# Patient Record
Sex: MALE | Race: BLACK OR AFRICAN AMERICAN | NOT HISPANIC OR LATINO | Employment: OTHER | RURAL
[De-identification: names, ages, dates, MRNs, and addresses within clinical notes are randomized per-mention and may not be internally consistent; named-entity substitution may affect disease eponyms.]

---

## 2021-05-18 ENCOUNTER — OFFICE VISIT (OUTPATIENT)
Dept: OTOLARYNGOLOGY | Facility: CLINIC | Age: 71
End: 2021-05-18
Payer: COMMERCIAL

## 2021-05-18 VITALS — BODY MASS INDEX: 27.35 KG/M2 | HEIGHT: 75 IN | WEIGHT: 220 LBS

## 2021-05-18 DIAGNOSIS — R42 DIZZINESS AND GIDDINESS: ICD-10-CM

## 2021-05-18 DIAGNOSIS — H90.3 SENSORINEURAL HEARING LOSS (SNHL) OF BOTH EARS: ICD-10-CM

## 2021-05-18 DIAGNOSIS — R42 DIZZINESS: Primary | ICD-10-CM

## 2021-05-18 PROCEDURE — 99213 OFFICE O/P EST LOW 20 MIN: CPT | Mod: PBBFAC | Performed by: OTOLARYNGOLOGY

## 2021-05-18 PROCEDURE — 99213 OFFICE O/P EST LOW 20 MIN: CPT | Mod: S$PBB,,, | Performed by: OTOLARYNGOLOGY

## 2021-05-18 PROCEDURE — 99213 PR OFFICE/OUTPT VISIT, EST, LEVL III, 20-29 MIN: ICD-10-PCS | Mod: S$PBB,,, | Performed by: OTOLARYNGOLOGY

## 2021-05-18 RX ORDER — DIAZEPAM 5 MG/1
5 TABLET ORAL EVERY 6 HOURS PRN
Qty: 2 TABLET | Refills: 0 | Status: SHIPPED | OUTPATIENT
Start: 2021-05-18 | End: 2021-06-17

## 2021-06-08 ENCOUNTER — OFFICE VISIT (OUTPATIENT)
Dept: OTOLARYNGOLOGY | Facility: CLINIC | Age: 71
End: 2021-06-08
Payer: COMMERCIAL

## 2021-06-08 VITALS — HEIGHT: 75 IN | WEIGHT: 220 LBS | BODY MASS INDEX: 27.35 KG/M2

## 2021-06-08 DIAGNOSIS — R42 DIZZINESS: Primary | ICD-10-CM

## 2021-06-08 DIAGNOSIS — H60.93 OTITIS EXTERNA OF BOTH EARS, UNSPECIFIED CHRONICITY, UNSPECIFIED TYPE: ICD-10-CM

## 2021-06-08 PROCEDURE — 99214 OFFICE O/P EST MOD 30 MIN: CPT | Mod: S$PBB,,, | Performed by: OTOLARYNGOLOGY

## 2021-06-08 PROCEDURE — 99214 PR OFFICE/OUTPT VISIT, EST, LEVL IV, 30-39 MIN: ICD-10-PCS | Mod: S$PBB,,, | Performed by: OTOLARYNGOLOGY

## 2021-06-08 PROCEDURE — 99213 OFFICE O/P EST LOW 20 MIN: CPT | Mod: PBBFAC | Performed by: OTOLARYNGOLOGY

## 2021-06-08 RX ORDER — NEOMYCIN SULFATE, POLYMYXIN B SULFATE AND HYDROCORTISONE 10; 3.5; 1 MG/ML; MG/ML; [USP'U]/ML
3 SUSPENSION/ DROPS AURICULAR (OTIC) 3 TIMES DAILY
Qty: 10 ML | Refills: 0 | Status: SHIPPED | OUTPATIENT
Start: 2021-06-08 | End: 2022-09-21

## 2021-06-08 RX ORDER — MECLIZINE HYDROCHLORIDE 25 MG/1
25 TABLET ORAL 3 TIMES DAILY PRN
Qty: 90 TABLET | Refills: 0 | Status: SHIPPED | OUTPATIENT
Start: 2021-06-08 | End: 2021-08-04

## 2021-07-20 DIAGNOSIS — R42 DIZZINESS: Primary | ICD-10-CM

## 2021-08-02 DIAGNOSIS — R42 VERTIGO: Primary | ICD-10-CM

## 2021-08-05 DIAGNOSIS — R42 DIZZINESS: Primary | ICD-10-CM

## 2021-08-23 DIAGNOSIS — R42 DIZZINESS: Primary | ICD-10-CM

## 2021-09-01 ENCOUNTER — DOCUMENTATION ONLY (OUTPATIENT)
Dept: CARDIOLOGY | Facility: CLINIC | Age: 71
End: 2021-09-01

## 2021-09-01 ENCOUNTER — OFFICE VISIT (OUTPATIENT)
Dept: CARDIOLOGY | Facility: CLINIC | Age: 71
End: 2021-09-01
Payer: COMMERCIAL

## 2021-09-01 VITALS
WEIGHT: 223 LBS | BODY MASS INDEX: 27.73 KG/M2 | DIASTOLIC BLOOD PRESSURE: 110 MMHG | OXYGEN SATURATION: 98 % | HEIGHT: 75 IN | SYSTOLIC BLOOD PRESSURE: 200 MMHG | HEART RATE: 83 BPM

## 2021-09-01 DIAGNOSIS — R42 DIZZINESS: ICD-10-CM

## 2021-09-01 DIAGNOSIS — I10 ESSENTIAL HYPERTENSION: ICD-10-CM

## 2021-09-01 DIAGNOSIS — R94.31 NONSPECIFIC ABNORMAL ELECTROCARDIOGRAM (ECG) (EKG): Primary | ICD-10-CM

## 2021-09-01 DIAGNOSIS — R01.1 HEART MURMUR, SYSTOLIC: ICD-10-CM

## 2021-09-01 PROCEDURE — 99205 OFFICE O/P NEW HI 60 MIN: CPT | Mod: S$PBB,,, | Performed by: INTERNAL MEDICINE

## 2021-09-01 PROCEDURE — 93005 ELECTROCARDIOGRAM TRACING: CPT | Mod: PBBFAC | Performed by: INTERNAL MEDICINE

## 2021-09-01 PROCEDURE — 99205 PR OFFICE/OUTPT VISIT, NEW, LEVL V, 60-74 MIN: ICD-10-PCS | Mod: S$PBB,,, | Performed by: INTERNAL MEDICINE

## 2021-09-01 PROCEDURE — 99215 OFFICE O/P EST HI 40 MIN: CPT | Mod: PBBFAC | Performed by: INTERNAL MEDICINE

## 2021-09-01 PROCEDURE — 93010 ELECTROCARDIOGRAM REPORT: CPT | Mod: S$PBB,,, | Performed by: INTERNAL MEDICINE

## 2021-09-01 PROCEDURE — 93010 EKG 12-LEAD: ICD-10-PCS | Mod: S$PBB,,, | Performed by: INTERNAL MEDICINE

## 2021-09-01 RX ORDER — AMLODIPINE BESYLATE 5 MG/1
5 TABLET ORAL DAILY
COMMUNITY

## 2021-09-11 PROBLEM — R42 DIZZINESS: Status: ACTIVE | Noted: 2021-09-11

## 2021-09-11 PROBLEM — R94.31 NONSPECIFIC ABNORMAL ELECTROCARDIOGRAM (ECG) (EKG): Status: ACTIVE | Noted: 2021-09-11

## 2021-09-11 PROBLEM — R01.1 HEART MURMUR, SYSTOLIC: Status: ACTIVE | Noted: 2021-09-11

## 2021-09-11 PROBLEM — I10 ESSENTIAL HYPERTENSION: Status: ACTIVE | Noted: 2021-09-11

## 2021-09-14 ENCOUNTER — HOSPITAL ENCOUNTER (OUTPATIENT)
Dept: CARDIOLOGY | Facility: HOSPITAL | Age: 71
Discharge: HOME | End: 2021-09-14
Attending: INTERNAL MEDICINE
Payer: COMMERCIAL

## 2021-09-14 VITALS — BODY MASS INDEX: 27.73 KG/M2 | HEIGHT: 75 IN | WEIGHT: 223 LBS

## 2021-09-14 DIAGNOSIS — R42 DIZZINESS: ICD-10-CM

## 2021-09-14 DIAGNOSIS — R94.31 NONSPECIFIC ABNORMAL ELECTROCARDIOGRAM (ECG) (EKG): ICD-10-CM

## 2021-09-14 PROCEDURE — 93306 ECHO (CUPID ONLY): ICD-10-PCS | Mod: 26,,, | Performed by: INTERNAL MEDICINE

## 2021-09-14 PROCEDURE — 93306 TTE W/DOPPLER COMPLETE: CPT

## 2021-09-14 PROCEDURE — 93225 XTRNL ECG REC<48 HRS REC: CPT

## 2021-09-14 PROCEDURE — 93306 TTE W/DOPPLER COMPLETE: CPT | Mod: 26,,, | Performed by: INTERNAL MEDICINE

## 2021-09-16 ENCOUNTER — OFFICE VISIT (OUTPATIENT)
Dept: NEUROLOGY | Facility: CLINIC | Age: 71
End: 2021-09-16
Payer: COMMERCIAL

## 2021-09-16 VITALS
BODY MASS INDEX: 28.29 KG/M2 | HEIGHT: 75 IN | HEART RATE: 90 BPM | WEIGHT: 227.5 LBS | DIASTOLIC BLOOD PRESSURE: 98 MMHG | SYSTOLIC BLOOD PRESSURE: 156 MMHG | OXYGEN SATURATION: 96 %

## 2021-09-16 DIAGNOSIS — R42 DIZZINESS: Primary | ICD-10-CM

## 2021-09-16 DIAGNOSIS — I10 ESSENTIAL HYPERTENSION: ICD-10-CM

## 2021-09-16 DIAGNOSIS — Z79.899 ON LONG TERM DRUG THERAPY: ICD-10-CM

## 2021-09-16 DIAGNOSIS — H81.10 BENIGN PAROXYSMAL POSITIONAL VERTIGO, UNSPECIFIED LATERALITY: ICD-10-CM

## 2021-09-16 PROBLEM — Q64.32 CONGENITAL STRICTURE OF URETHRA: Status: ACTIVE | Noted: 2021-09-16

## 2021-09-16 PROBLEM — N39.0 URINARY TRACT INFECTIOUS DISEASE: Status: ACTIVE | Noted: 2019-05-02

## 2021-09-16 PROCEDURE — 99203 OFFICE O/P NEW LOW 30 MIN: CPT | Mod: S$PBB,,, | Performed by: NURSE PRACTITIONER

## 2021-09-16 PROCEDURE — 99215 OFFICE O/P EST HI 40 MIN: CPT | Mod: PBBFAC | Performed by: NURSE PRACTITIONER

## 2021-09-16 PROCEDURE — 99203 PR OFFICE/OUTPT VISIT, NEW, LEVL III, 30-44 MIN: ICD-10-PCS | Mod: S$PBB,,, | Performed by: NURSE PRACTITIONER

## 2021-09-16 RX ORDER — FLUTICASONE PROPIONATE 50 MCG
SPRAY, SUSPENSION (ML) NASAL
COMMUNITY
Start: 2021-08-04

## 2021-09-16 RX ORDER — LOSARTAN POTASSIUM 50 MG/1
50 TABLET ORAL DAILY
COMMUNITY
Start: 2021-09-01

## 2021-09-17 ENCOUNTER — TELEPHONE (OUTPATIENT)
Dept: NEUROLOGY | Facility: CLINIC | Age: 71
End: 2021-09-17

## 2021-09-17 DIAGNOSIS — E55.9 VITAMIN D DEFICIENCY: Primary | ICD-10-CM

## 2021-09-17 RX ORDER — ERGOCALCIFEROL 1.25 MG/1
CAPSULE ORAL
Qty: 12 CAPSULE | Refills: 1 | Status: SHIPPED | OUTPATIENT
Start: 2021-09-17 | End: 2022-03-14

## 2021-09-20 LAB
AORTIC VALVE CUSP SEPERATION: 1.89 CM
AV INDEX (PROSTH): 0.58
AV MEAN GRADIENT: 11 MMHG
AV VALVE AREA: 1.83 CM2
BSA FOR ECHO PROCEDURE: 2.31 M2
CV ECHO LV RWT: 0.56 CM
DOP CALC AO VTI: 41.99 CM
DOP CALC LVOT AREA: 3.1 CM2
DOP CALC LVOT DIAMETER: 2 CM
DOP CALC LVOT STROKE VOLUME: 77.02 CM3
DOP CALCLVOT PEAK VEL VTI: 24.53 CM
E WAVE DECELERATION TIME: 271 MSEC
E/A RATIO: 0.76
E/E' RATIO: 6 M/S
ECHO LV POSTERIOR WALL: 1.22 CM (ref 0.6–1.1)
EJECTION FRACTION: 55 %
FRACTIONAL SHORTENING: 44 % (ref 28–44)
INTERVENTRICULAR SEPTUM: 1.28 CM (ref 0.6–1.1)
IVC DIAMETER: 1 CM
LEFT ATRIUM SIZE: 2.9 CM
LEFT INTERNAL DIMENSION IN SYSTOLE: 2.44 CM (ref 2.1–4)
LEFT VENTRICLE DIASTOLIC VOLUME INDEX: 45.52 ML/M2
LEFT VENTRICLE DIASTOLIC VOLUME: 104.7 ML
LEFT VENTRICLE MASS INDEX: 87 G/M2
LEFT VENTRICLE SYSTOLIC VOLUME INDEX: 5.3 ML/M2
LEFT VENTRICLE SYSTOLIC VOLUME: 12.1 ML
LEFT VENTRICULAR INTERNAL DIMENSION IN DIASTOLE: 4.37 CM (ref 3.5–6)
LEFT VENTRICULAR MASS: 200.94 G
LV LATERAL E/E' RATIO: 5.2 M/S
LV SEPTAL E/E' RATIO: 7.09 M/S
LVOT MG: 3 MMHG
MV PEAK A VEL: 1.03 M/S
MV PEAK E VEL: 0.78 M/S
OHS CV EVENT MONITOR DAY: 0
OHS CV HOLTER LENGTH DECIMAL HOURS: 24
OHS CV HOLTER LENGTH HOURS: 24
OHS CV HOLTER LENGTH MINUTES: 0
OHS CV HOLTER SINUS AVERAGE HR: 60
OHS CV HOLTER SINUS MAX HR: 103
OHS CV HOLTER SINUS MIN HR: 44
PISA TR MAX VEL: 2.73 M/S
RA WIDTH: 2.3 CM
RIGHT VENTRICULAR END-DIASTOLIC DIMENSION: 3.2 CM
RIGHT VENTRICULAR LENGTH IN DIASTOLE (APICAL 4-CHAMBER VIEW): 5.93 CM
RV MID DIAMA: 2.26 CM
TDI LATERAL: 0.15 M/S
TDI SEPTAL: 0.11 M/S
TDI: 0.13 M/S
TR MAX PG: 30 MMHG
TRICUSPID ANNULAR PLANE SYSTOLIC EXCURSION: 3.23 CM

## 2021-09-20 PROCEDURE — 93227 XTRNL ECG REC<48 HR R&I: CPT | Mod: ,,, | Performed by: INTERNAL MEDICINE

## 2021-09-20 PROCEDURE — 93227 HOLTER MONITOR - 24 HOUR (CUPID ONLY): ICD-10-PCS | Mod: ,,, | Performed by: INTERNAL MEDICINE

## 2021-09-22 ENCOUNTER — CLINICAL SUPPORT (OUTPATIENT)
Dept: REHABILITATION | Facility: HOSPITAL | Age: 71
End: 2021-09-22
Payer: COMMERCIAL

## 2021-09-22 DIAGNOSIS — H83.2X9 VESTIBULAR HYPOFUNCTION, UNSPECIFIED LATERALITY: Primary | ICD-10-CM

## 2021-09-22 DIAGNOSIS — H81.10 BENIGN PAROXYSMAL POSITIONAL VERTIGO, UNSPECIFIED LATERALITY: ICD-10-CM

## 2021-09-22 PROCEDURE — 97162 PT EVAL MOD COMPLEX 30 MIN: CPT

## 2021-09-27 ENCOUNTER — CLINICAL SUPPORT (OUTPATIENT)
Dept: REHABILITATION | Facility: HOSPITAL | Age: 71
End: 2021-09-27
Payer: COMMERCIAL

## 2021-09-27 DIAGNOSIS — H83.2X9 VESTIBULAR HYPOFUNCTION, UNSPECIFIED LATERALITY: ICD-10-CM

## 2021-09-27 DIAGNOSIS — R42 DIZZINESS: Chronic | ICD-10-CM

## 2021-09-27 DIAGNOSIS — H83.2X3 VESTIBULAR HYPOFUNCTION OF BOTH EARS: Primary | ICD-10-CM

## 2021-09-27 PROCEDURE — 97112 NEUROMUSCULAR REEDUCATION: CPT

## 2021-09-27 PROCEDURE — 97116 GAIT TRAINING THERAPY: CPT

## 2021-09-30 ENCOUNTER — CLINICAL SUPPORT (OUTPATIENT)
Dept: REHABILITATION | Facility: HOSPITAL | Age: 71
End: 2021-09-30
Payer: COMMERCIAL

## 2021-09-30 DIAGNOSIS — H83.2X9 VESTIBULAR HYPOFUNCTION, UNSPECIFIED LATERALITY: Primary | ICD-10-CM

## 2021-09-30 PROCEDURE — 97112 NEUROMUSCULAR REEDUCATION: CPT

## 2021-09-30 PROCEDURE — 97140 MANUAL THERAPY 1/> REGIONS: CPT

## 2021-10-05 ENCOUNTER — OFFICE VISIT (OUTPATIENT)
Dept: OTOLARYNGOLOGY | Facility: CLINIC | Age: 71
End: 2021-10-05
Payer: COMMERCIAL

## 2021-10-05 VITALS — HEIGHT: 75 IN | WEIGHT: 227 LBS | BODY MASS INDEX: 28.23 KG/M2

## 2021-10-05 DIAGNOSIS — R42 VERTIGO: ICD-10-CM

## 2021-10-05 DIAGNOSIS — H60.93 OTITIS EXTERNA OF BOTH EARS, UNSPECIFIED CHRONICITY, UNSPECIFIED TYPE: Primary | ICD-10-CM

## 2021-10-05 DIAGNOSIS — R09.81 CHRONIC NASAL CONGESTION: ICD-10-CM

## 2021-10-05 PROCEDURE — 99214 PR OFFICE/OUTPT VISIT, EST, LEVL IV, 30-39 MIN: ICD-10-PCS | Mod: S$PBB,,, | Performed by: OTOLARYNGOLOGY

## 2021-10-05 PROCEDURE — 99213 OFFICE O/P EST LOW 20 MIN: CPT | Mod: PBBFAC | Performed by: OTOLARYNGOLOGY

## 2021-10-05 PROCEDURE — 99214 OFFICE O/P EST MOD 30 MIN: CPT | Mod: S$PBB,,, | Performed by: OTOLARYNGOLOGY

## 2021-10-05 RX ORDER — NEOMYCIN SULFATE, POLYMYXIN B SULFATE AND HYDROCORTISONE 10; 3.5; 1 MG/ML; MG/ML; [USP'U]/ML
3 SUSPENSION/ DROPS AURICULAR (OTIC) 2 TIMES DAILY
Qty: 10 ML | Refills: 0 | Status: SHIPPED | OUTPATIENT
Start: 2021-10-05 | End: 2022-09-21

## 2021-10-11 ENCOUNTER — DOCUMENTATION ONLY (OUTPATIENT)
Dept: REHABILITATION | Facility: HOSPITAL | Age: 71
End: 2021-10-11

## 2021-12-20 DIAGNOSIS — L29.9 ITCHING OF EAR: Primary | ICD-10-CM

## 2021-12-20 RX ORDER — NEOMYCIN SULFATE, POLYMYXIN B SULFATE AND HYDROCORTISONE 10; 3.5; 1 MG/ML; MG/ML; [USP'U]/ML
3 SUSPENSION/ DROPS AURICULAR (OTIC) 2 TIMES DAILY
Qty: 10 ML | Refills: 0 | Status: SHIPPED | OUTPATIENT
Start: 2021-12-20 | End: 2022-09-21

## 2022-01-31 ENCOUNTER — OFFICE VISIT (OUTPATIENT)
Dept: FAMILY MEDICINE | Facility: CLINIC | Age: 72
End: 2022-01-31
Payer: COMMERCIAL

## 2022-01-31 VITALS
OXYGEN SATURATION: 97 % | TEMPERATURE: 98 F | BODY MASS INDEX: 27.48 KG/M2 | DIASTOLIC BLOOD PRESSURE: 68 MMHG | HEART RATE: 60 BPM | SYSTOLIC BLOOD PRESSURE: 121 MMHG | RESPIRATION RATE: 17 BRPM | WEIGHT: 221 LBS | HEIGHT: 75 IN

## 2022-01-31 DIAGNOSIS — Z20.822 EXPOSURE TO COVID-19 VIRUS: Primary | ICD-10-CM

## 2022-01-31 DIAGNOSIS — Z20.822 ENCOUNTER FOR LABORATORY TESTING FOR COVID-19 VIRUS: ICD-10-CM

## 2022-01-31 DIAGNOSIS — U07.1 COVID-19: ICD-10-CM

## 2022-01-31 LAB — SARS-COV-2 RNA RESP QL NAA+PROBE: POSITIVE

## 2022-01-31 PROCEDURE — 87635 SARS-COV-2 COVID-19 AMP PRB: CPT | Mod: ,,, | Performed by: CLINICAL MEDICAL LABORATORY

## 2022-01-31 PROCEDURE — 3078F PR MOST RECENT DIASTOLIC BLOOD PRESSURE < 80 MM HG: ICD-10-PCS | Mod: ,,, | Performed by: NURSE PRACTITIONER

## 2022-01-31 PROCEDURE — 1159F PR MEDICATION LIST DOCUMENTED IN MEDICAL RECORD: ICD-10-PCS | Mod: ,,, | Performed by: NURSE PRACTITIONER

## 2022-01-31 PROCEDURE — 3008F PR BODY MASS INDEX (BMI) DOCUMENTED: ICD-10-PCS | Mod: ,,, | Performed by: NURSE PRACTITIONER

## 2022-01-31 PROCEDURE — 87635 SARS-COV-2 (COVID-19) QUALITATIVE PCR: ICD-10-PCS | Mod: ,,, | Performed by: CLINICAL MEDICAL LABORATORY

## 2022-01-31 PROCEDURE — 1159F MED LIST DOCD IN RCRD: CPT | Mod: ,,, | Performed by: NURSE PRACTITIONER

## 2022-01-31 PROCEDURE — 3074F PR MOST RECENT SYSTOLIC BLOOD PRESSURE < 130 MM HG: ICD-10-PCS | Mod: ,,, | Performed by: NURSE PRACTITIONER

## 2022-01-31 PROCEDURE — 99202 PR OFFICE/OUTPT VISIT, NEW, LEVL II, 15-29 MIN: ICD-10-PCS | Mod: ,,, | Performed by: NURSE PRACTITIONER

## 2022-01-31 PROCEDURE — 99202 OFFICE O/P NEW SF 15 MIN: CPT | Mod: ,,, | Performed by: NURSE PRACTITIONER

## 2022-01-31 PROCEDURE — 3008F BODY MASS INDEX DOCD: CPT | Mod: ,,, | Performed by: NURSE PRACTITIONER

## 2022-01-31 PROCEDURE — 3074F SYST BP LT 130 MM HG: CPT | Mod: ,,, | Performed by: NURSE PRACTITIONER

## 2022-01-31 PROCEDURE — 3078F DIAST BP <80 MM HG: CPT | Mod: ,,, | Performed by: NURSE PRACTITIONER

## 2022-01-31 RX ORDER — NAPROXEN SODIUM 220 MG/1
81 TABLET, FILM COATED ORAL DAILY
COMMUNITY
Start: 2021-10-14

## 2022-01-31 RX ORDER — GABAPENTIN 300 MG/1
300 CAPSULE ORAL 3 TIMES DAILY
COMMUNITY
Start: 2021-11-10 | End: 2023-06-13

## 2022-01-31 NOTE — PROGRESS NOTES
IZABELLA Lucas   [facility]      PATIENT NAME: James Martinez  : 1950  DATE: 22  MRN: [id number]      Patient PCP Information     Provider PCP Type    Trevor Resendez MD General          Reason for Visit / Chief Complaint: exposure to covid         History of Present Illness / Problem Focused Workflow     James Martinez presents to the clinic with exposure to covid     HPI   Mr Martinez presents for covid screening. Per patient family was visiting from [place]. Patients family left last 2022 to return to [place]. Pt was notified that family has tested positive for covid upon return. Patient is currently asymptomatic. Vaccinated with moderna x 3 doses.     Review of Systems     Review of Systems   Constitutional: Negative for activity change, appetite change, chills, diaphoresis, fatigue, fever and unexpected weight change.   HENT: Negative for congestion, ear pain, facial swelling, hearing loss, nosebleeds and sore throat.    Respiratory: Negative for apnea, cough, shortness of breath and wheezing.    Cardiovascular: Negative for chest pain, palpitations and leg swelling.   Gastrointestinal: Negative for abdominal distention, abdominal pain, blood in stool, constipation, diarrhea and nausea.   Endocrine: Negative for cold intolerance, heat intolerance, polydipsia, polyphagia and polyuria.   Genitourinary: Negative for decreased urine volume, difficulty urinating, dysuria, flank pain, frequency, hematuria and urgency.   Musculoskeletal: Negative for arthralgias, joint swelling and myalgias.   Skin: Negative for color change and rash.   Neurological: Negative for dizziness, tremors, seizures, syncope, facial asymmetry, speech difficulty, weakness, light-headedness, numbness and headaches.   Hematological: Negative for adenopathy. Does not bruise/bleed easily.   Psychiatric/Behavioral: Negative for behavioral problems and confusion.       Medical / Social / Family History  "    Past Medical History:   Diagnosis Date    Hypertension        Past Surgical History:   Procedure Laterality Date    APPENDECTOMY      BACK SURGERY      ROTATOR CUFF REPAIR Right        Social History    reports that he has quit smoking. His smoking use included cigarettes. He has never used smokeless tobacco. He reports previous drug use. Drug: Marijuana. He reports that he does not drink alcohol.    Family History  's family history includes Hypertension in his mother.    Medications and Allergies     Medications  Outpatient Medications Marked as Taking for the 1/31/22 encounter (Office Visit) with IZABELLA Lucas   Medication Sig Dispense Refill    amLODIPine (NORVASC) 5 MG tablet Take 5 mg by mouth once daily.      aspirin 81 MG Chew Take 81 mg by mouth once daily.      fluticasone propionate (FLONASE) 50 mcg/actuation nasal spray INHALE 1 TO 2 PUFFS IN EACH NOSTRIL AT BEDTIME      gabapentin (NEURONTIN) 300 MG capsule Take 300 mg by mouth 3 (three) times daily.      losartan (COZAAR) 50 MG tablet Take 50 mg by mouth once daily.         Allergies  Review of patient's allergies indicates:   Allergen Reactions    Pcn [penicillins]     Penicillamine        Physical Examination     Vitals:    01/31/22 1128   BP: 121/68   BP Location: Right arm   Patient Position: Sitting   Pulse: 60   Resp: 17   Temp: 97.6 °F (36.4 °C)   SpO2: 97%   Weight: 100.2 kg (221 lb)   Height: 6' 3" (1.905 m)       Physical Exam  Vitals reviewed.   Constitutional:       Appearance: Normal appearance.   HENT:      Head: Normocephalic.      Right Ear: External ear normal.      Left Ear: External ear normal.      Nose: Nose normal.      Mouth/Throat:      Mouth: Mucous membranes are moist.   Eyes:      Extraocular Movements: Extraocular movements intact.   Cardiovascular:      Rate and Rhythm: Normal rate.      Pulses: Normal pulses.      Heart sounds: Normal heart sounds.   Pulmonary:      Effort: Pulmonary effort is " normal. No respiratory distress.      Breath sounds: Normal breath sounds. No stridor. No wheezing, rhonchi or rales.   Chest:      Chest wall: No tenderness.   Musculoskeletal:         General: Normal range of motion.      Cervical back: Normal range of motion.   Skin:     General: Skin is warm and dry.      Capillary Refill: Capillary refill takes less than 2 seconds.   Neurological:      General: No focal deficit present.      Mental Status: He is alert.   Psychiatric:         Mood and Affect: Mood normal.         Behavior: Behavior normal.         Thought Content: Thought content normal.         Judgment: Judgment normal.           Office Visit on 01/31/2022   Component Date Value Ref Range Status    SARS CoV-2 PCR 01/31/2022 Positive* Negative, Invalid Final             Assessment and Plan (including Health Maintenance)     Plan:   Exposure to COVID-19 virus    Encounter for laboratory testing for COVID-19 virus  -     COVID-19 Routine Screening; Future; Expected date: 01/31/2022    COVID-19     Patients PCR returned positive. Called kofi 2/1 to notify of results. Now with cough sob. Referral placed for monoclonal infusion. Moderate risk.   Patient not at clinic when results received. Fact and consent to be obtained by infusion staff scheduled. Order faxed.   There are no Patient Instructions on file for this visit.       Health Maintenance Due   Topic Date Due    Hepatitis C Screening  Never done    COVID-19 Vaccine (1) Never done    TETANUS VACCINE  Never done    Colorectal Cancer Screening  Never done    Shingles Vaccine (1 of 2) Never done    Pneumococcal Vaccines (Age 65+) (1 of 1 - PPSV23) Never done    Abdominal Aortic Aneurysm Screening  Never done    Influenza Vaccine (1) Never done         There is no immunization history on file for this patient.     Problem List Items Addressed This Visit    None     Visit Diagnoses     Exposure to COVID-19 virus    -  Primary    Encounter for laboratory  testing for COVID-19 virus        Relevant Orders    COVID-19 Routine Screening (Completed)    COVID-19              Health Maintenance Topics with due status: Not Due       Topic Last Completion Date    Lipid Panel 10/30/2018       Future Appointments   Date Time Provider Department Center   2/17/2022  3:00 PM IZABELLA Lucas [facility]   3/23/2022  9:30 AM Donovan Moreno NP OB NEURO [facility]            Signature:  IZABELLA Lucas  [facility]     Date of encounter: 1/31/22

## 2022-02-01 DIAGNOSIS — U07.1 COVID-19 VIRUS DETECTED: ICD-10-CM

## 2022-02-08 ENCOUNTER — OFFICE VISIT (OUTPATIENT)
Dept: FAMILY MEDICINE | Facility: CLINIC | Age: 72
End: 2022-02-08
Payer: COMMERCIAL

## 2022-02-08 VITALS
OXYGEN SATURATION: 97 % | DIASTOLIC BLOOD PRESSURE: 88 MMHG | TEMPERATURE: 98 F | SYSTOLIC BLOOD PRESSURE: 151 MMHG | BODY MASS INDEX: 27.48 KG/M2 | HEART RATE: 64 BPM | RESPIRATION RATE: 18 BRPM | HEIGHT: 75 IN | WEIGHT: 221 LBS

## 2022-02-08 DIAGNOSIS — U07.1 COVID-19: Primary | ICD-10-CM

## 2022-02-08 LAB
CTP QC/QA: YES
SARS-COV-2 AG RESP QL IA.RAPID: POSITIVE

## 2022-02-08 PROCEDURE — 3008F PR BODY MASS INDEX (BMI) DOCUMENTED: ICD-10-PCS | Mod: ,,, | Performed by: NURSE PRACTITIONER

## 2022-02-08 PROCEDURE — 3077F PR MOST RECENT SYSTOLIC BLOOD PRESSURE >= 140 MM HG: ICD-10-PCS | Mod: ,,, | Performed by: NURSE PRACTITIONER

## 2022-02-08 PROCEDURE — 3079F PR MOST RECENT DIASTOLIC BLOOD PRESSURE 80-89 MM HG: ICD-10-PCS | Mod: ,,, | Performed by: NURSE PRACTITIONER

## 2022-02-08 PROCEDURE — 3008F BODY MASS INDEX DOCD: CPT | Mod: ,,, | Performed by: NURSE PRACTITIONER

## 2022-02-08 PROCEDURE — 87426 SARSCOV CORONAVIRUS AG IA: CPT | Mod: QW,,, | Performed by: NURSE PRACTITIONER

## 2022-02-08 PROCEDURE — 1159F PR MEDICATION LIST DOCUMENTED IN MEDICAL RECORD: ICD-10-PCS | Mod: ,,, | Performed by: NURSE PRACTITIONER

## 2022-02-08 PROCEDURE — 87426 SARS CORONAVIRUS 2 ANTIGEN POCT: ICD-10-PCS | Mod: QW,,, | Performed by: NURSE PRACTITIONER

## 2022-02-08 PROCEDURE — 1160F RVW MEDS BY RX/DR IN RCRD: CPT | Mod: ,,, | Performed by: NURSE PRACTITIONER

## 2022-02-08 PROCEDURE — 1159F MED LIST DOCD IN RCRD: CPT | Mod: ,,, | Performed by: NURSE PRACTITIONER

## 2022-02-08 PROCEDURE — 99212 PR OFFICE/OUTPT VISIT, EST, LEVL II, 10-19 MIN: ICD-10-PCS | Mod: ,,, | Performed by: NURSE PRACTITIONER

## 2022-02-08 PROCEDURE — 3077F SYST BP >= 140 MM HG: CPT | Mod: ,,, | Performed by: NURSE PRACTITIONER

## 2022-02-08 PROCEDURE — 99212 OFFICE O/P EST SF 10 MIN: CPT | Mod: ,,, | Performed by: NURSE PRACTITIONER

## 2022-02-08 PROCEDURE — 3079F DIAST BP 80-89 MM HG: CPT | Mod: ,,, | Performed by: NURSE PRACTITIONER

## 2022-02-08 PROCEDURE — 1160F PR REVIEW ALL MEDS BY PRESCRIBER/CLIN PHARMACIST DOCUMENTED: ICD-10-PCS | Mod: ,,, | Performed by: NURSE PRACTITIONER

## 2022-02-08 NOTE — PROGRESS NOTES
IZABELLA Lucas   [facility]      PATIENT NAME: James Martinez  : 1950  DATE: 22  MRN: [id number]      Patient PCP Information     Provider PCP Type    Trevor Resendez MD General          Reason for Visit / Chief Complaint: COVID-19 Concerns         History of Present Illness / Problem Focused Workflow     James Martinez presents to the clinic with COVID-19 Concerns     HPI   Mr Martinez is here for follow up covid test. He is scheduled for vascular appt tomorrow in [place]. Requested follow up test to attend appt. Patient completed quarantine. No concerns voiced. Positive 2022    Review of Systems     Review of Systems   Constitutional: Negative.    HENT: Negative.    Eyes: Negative.    Respiratory: Negative.    Cardiovascular: Negative.    Gastrointestinal: Negative.    Endocrine: Negative.    Genitourinary: Negative.    Musculoskeletal: Negative.    Skin: Negative.    Allergic/Immunologic: Negative.    Neurological: Negative.    Hematological: Negative.    Psychiatric/Behavioral: Negative.        Medical / Social / Family History     Past Medical History:   Diagnosis Date    Hypertension        Past Surgical History:   Procedure Laterality Date    APPENDECTOMY      BACK SURGERY      ROTATOR CUFF REPAIR Right        Social History    reports that he has quit smoking. His smoking use included cigarettes. He has never used smokeless tobacco. He reports previous drug use. Drug: Marijuana. He reports that he does not drink alcohol.    Family History  's family history includes Hypertension in his mother.    Medications and Allergies     Medications  No outpatient medications have been marked as taking for the 22 encounter (Office Visit) with IZABELLA Lucas.       Allergies  Review of patient's allergies indicates:   Allergen Reactions    Pcn [penicillins]     Penicillamine        Physical Examination     Vitals:    22 1429   BP: (!) 151/88   BP Location: Left  "arm  Comment: left wrist   Patient Position: Sitting   BP Method: Medium (Automatic)   Pulse: 64   Resp: 18   Temp: 98.3 °F (36.8 °C)   SpO2: 97%   Weight: 100.2 kg (221 lb)  Comment: prev visit   Height: 6' 3" (1.905 m)  Comment: prev appt       Physical Exam  Vitals reviewed.   HENT:      Head: Normocephalic.      Right Ear: External ear normal.      Left Ear: External ear normal.      Nose: Nose normal.      Mouth/Throat:      Mouth: Mucous membranes are moist.   Cardiovascular:      Rate and Rhythm: Normal rate.      Pulses: Normal pulses.   Pulmonary:      Effort: Pulmonary effort is normal.   Abdominal:      Palpations: Abdomen is soft.   Musculoskeletal:         General: Normal range of motion.      Cervical back: Normal range of motion.   Skin:     General: Skin is warm and dry.      Capillary Refill: Capillary refill takes less than 2 seconds.   Neurological:      Mental Status: He is alert.      Comments: Slow response time   Psychiatric:         Mood and Affect: Mood normal.         Behavior: Behavior normal.         Thought Content: Thought content normal.         Judgment: Judgment normal.           Office Visit on 01/31/2022   Component Date Value Ref Range Status    SARS CoV-2 PCR 01/31/2022 Positive* Negative, Invalid Final             Assessment and Plan (including Health Maintenance)     Plan:   COVID-19  -     SARS Coronavirus 2 Antigen, POCT     Test remains positive afebrile quarantine complete  There are no Patient Instructions on file for this visit.       Health Maintenance Due   Topic Date Due    Hepatitis C Screening  Never done    Lipid Panel  Never done    TETANUS VACCINE  Never done    Colorectal Cancer Screening  Never done    Shingles Vaccine (1 of 2) Never done    Pneumococcal Vaccines (Age 65+) (1 of 1 - PPSV23) Never done    Abdominal Aortic Aneurysm Screening  Never done    COVID-19 Vaccine (3 - Booster for Pfizer series) 08/22/2021    Influenza Vaccine (1) Never done "       Most Recent Immunizations   Administered Date(s) Administered    COVID-19, MRNA, LN-S, PF (Pfizer) (Purple Cap) 02/22/2021        Problem List Items Addressed This Visit    None     Visit Diagnoses     COVID-19    -  Primary    Relevant Orders    SARS Coronavirus 2 Antigen, POCT          The patient has no Health Maintenance topics of status Not Due    Future Appointments   Date Time Provider Department Center   2/8/2022  3:00 PM IZABELLA Lucas [facility]   2/17/2022  3:00 PM IZABELLA Lucas CCC [facility]   3/23/2022  9:30 AM Donovan Moreno NP [facility] NEURO [facility]            Signature:  IZABELLA Lucas  [facility]     Date of encounter: 2/8/22

## 2022-03-14 DIAGNOSIS — E55.9 VITAMIN D DEFICIENCY: ICD-10-CM

## 2022-03-14 DIAGNOSIS — R42 DIZZINESS: ICD-10-CM

## 2022-03-14 RX ORDER — ERGOCALCIFEROL 1.25 MG/1
CAPSULE ORAL
Qty: 12 CAPSULE | Refills: 1 | Status: SHIPPED | OUTPATIENT
Start: 2022-03-14 | End: 2022-09-21 | Stop reason: SDUPTHER

## 2022-03-14 RX ORDER — MECLIZINE HYDROCHLORIDE 25 MG/1
TABLET ORAL
Qty: 90 TABLET | Refills: 0 | Status: SHIPPED | OUTPATIENT
Start: 2022-03-14 | End: 2023-06-13
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
## 2022-06-08 ENCOUNTER — OFFICE VISIT (OUTPATIENT)
Dept: NEUROLOGY | Facility: CLINIC | Age: 72
End: 2022-06-08
Payer: COMMERCIAL

## 2022-06-08 ENCOUNTER — TELEPHONE (OUTPATIENT)
Dept: NEUROLOGY | Facility: CLINIC | Age: 72
End: 2022-06-08
Payer: COMMERCIAL

## 2022-06-08 VITALS
BODY MASS INDEX: 27.58 KG/M2 | HEIGHT: 75 IN | DIASTOLIC BLOOD PRESSURE: 88 MMHG | HEART RATE: 67 BPM | OXYGEN SATURATION: 98 % | SYSTOLIC BLOOD PRESSURE: 150 MMHG | WEIGHT: 221.81 LBS

## 2022-06-08 DIAGNOSIS — Z79.899 ON LONG TERM DRUG THERAPY: ICD-10-CM

## 2022-06-08 DIAGNOSIS — R20.2 PARESTHESIA: ICD-10-CM

## 2022-06-08 DIAGNOSIS — R29.898 WEAKNESS OF BOTH LOWER EXTREMITIES: ICD-10-CM

## 2022-06-08 DIAGNOSIS — R42 DIZZINESS: Primary | Chronic | ICD-10-CM

## 2022-06-08 DIAGNOSIS — I10 ESSENTIAL HYPERTENSION: Chronic | ICD-10-CM

## 2022-06-08 DIAGNOSIS — R26.89 BALANCE PROBLEMS: ICD-10-CM

## 2022-06-08 DIAGNOSIS — E55.9 VITAMIN D DEFICIENCY: ICD-10-CM
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
## 2022-06-08 PROBLEM — S27.329A PULMONARY CONTUSION: Status: ACTIVE | Noted: 2021-10-08

## 2022-06-08 PROBLEM — F41.9 ANXIETY: Status: ACTIVE | Noted: 2022-06-08

## 2022-06-08 PROBLEM — R73.9 HYPERGLYCEMIA: Status: ACTIVE | Noted: 2022-06-08

## 2022-06-08 PROBLEM — E11.9 TYPE 2 DIABETES MELLITUS: Status: ACTIVE | Noted: 2021-10-08

## 2022-06-08 PROBLEM — S22.009A: Status: ACTIVE | Noted: 2021-10-08

## 2022-06-08 PROBLEM — E78.2 MIXED HYPERLIPIDEMIA: Status: ACTIVE | Noted: 2022-06-08

## 2022-06-08 PROBLEM — S27.0XXA TRAUMATIC PNEUMOTHORAX: Status: ACTIVE | Noted: 2021-10-08

## 2022-06-08 PROBLEM — S32.009A LUMBAR TRANSVERSE PROCESS FRACTURE: Status: ACTIVE | Noted: 2021-10-08

## 2022-06-08 PROCEDURE — 1160F RVW MEDS BY RX/DR IN RCRD: CPT | Mod: CPTII,,, | Performed by: NURSE PRACTITIONER

## 2022-06-08 PROCEDURE — 1159F MED LIST DOCD IN RCRD: CPT | Mod: CPTII,,, | Performed by: NURSE PRACTITIONER

## 2022-06-08 PROCEDURE — 1101F PT FALLS ASSESS-DOCD LE1/YR: CPT | Mod: CPTII,,, | Performed by: NURSE PRACTITIONER

## 2022-06-08 PROCEDURE — 4010F ACE/ARB THERAPY RXD/TAKEN: CPT | Mod: CPTII,,, | Performed by: NURSE PRACTITIONER

## 2022-06-08 PROCEDURE — 1160F PR REVIEW ALL MEDS BY PRESCRIBER/CLIN PHARMACIST DOCUMENTED: ICD-10-PCS | Mod: CPTII,,, | Performed by: NURSE PRACTITIONER

## 2022-06-08 PROCEDURE — 3077F SYST BP >= 140 MM HG: CPT | Mod: CPTII,,, | Performed by: NURSE PRACTITIONER

## 2022-06-08 PROCEDURE — 3008F BODY MASS INDEX DOCD: CPT | Mod: CPTII,,, | Performed by: NURSE PRACTITIONER

## 2022-06-08 PROCEDURE — 3079F PR MOST RECENT DIASTOLIC BLOOD PRESSURE 80-89 MM HG: ICD-10-PCS | Mod: CPTII,,, | Performed by: NURSE PRACTITIONER

## 2022-06-08 PROCEDURE — 99214 OFFICE O/P EST MOD 30 MIN: CPT | Mod: S$PBB,,, | Performed by: NURSE PRACTITIONER

## 2022-06-08 PROCEDURE — 3079F DIAST BP 80-89 MM HG: CPT | Mod: CPTII,,, | Performed by: NURSE PRACTITIONER

## 2022-06-08 PROCEDURE — 1101F PR PT FALLS ASSESS DOC 0-1 FALLS W/OUT INJ PAST YR: ICD-10-PCS | Mod: CPTII,,, | Performed by: NURSE PRACTITIONER

## 2022-06-08 PROCEDURE — 4010F PR ACE/ARB THEARPY RXD/TAKEN: ICD-10-PCS | Mod: CPTII,,, | Performed by: NURSE PRACTITIONER

## 2022-06-08 PROCEDURE — 3288F FALL RISK ASSESSMENT DOCD: CPT | Mod: CPTII,,, | Performed by: NURSE PRACTITIONER

## 2022-06-08 PROCEDURE — 1159F PR MEDICATION LIST DOCUMENTED IN MEDICAL RECORD: ICD-10-PCS | Mod: CPTII,,, | Performed by: NURSE PRACTITIONER

## 2022-06-08 PROCEDURE — 3008F PR BODY MASS INDEX (BMI) DOCUMENTED: ICD-10-PCS | Mod: CPTII,,, | Performed by: NURSE PRACTITIONER

## 2022-06-08 PROCEDURE — 99215 OFFICE O/P EST HI 40 MIN: CPT | Mod: PBBFAC | Performed by: NURSE PRACTITIONER

## 2022-06-08 PROCEDURE — 3077F PR MOST RECENT SYSTOLIC BLOOD PRESSURE >= 140 MM HG: ICD-10-PCS | Mod: CPTII,,, | Performed by: NURSE PRACTITIONER

## 2022-06-08 PROCEDURE — 3288F PR FALLS RISK ASSESSMENT DOCUMENTED: ICD-10-PCS | Mod: CPTII,,, | Performed by: NURSE PRACTITIONER

## 2022-06-08 PROCEDURE — 99214 PR OFFICE/OUTPT VISIT, EST, LEVL IV, 30-39 MIN: ICD-10-PCS | Mod: S$PBB,,, | Performed by: NURSE PRACTITIONER

## 2022-06-08 RX ORDER — CETIRIZINE HYDROCHLORIDE 10 MG/1
TABLET ORAL
COMMUNITY
Start: 2022-01-26

## 2022-06-08 RX ORDER — LORAZEPAM 0.5 MG/1
TABLET ORAL
Qty: 2 TABLET | Refills: 0 | Status: SHIPPED | OUTPATIENT
Start: 2022-06-08 | End: 2022-09-21

## 2022-06-08 NOTE — PATIENT INSTRUCTIONS
1. Labs: cbc, cmp  2. Referral to physical therapy for gait, balance, and dizziness  3. Follow up with eye doctor for blurred vision  4. Keep follow up with cardiologist Dr. Saxena  5. Keep follow up with ENT Dr. De La O  6. Regular follow up with PCP for medical management and blood pressure  7. Keep blood pressure under control  8. Orthostatic BP check  9. Referral for EMG NCS all 4 extremities at [facility]  10. MRI of the lumbar spine with and without contrast  11. Rx ativan 0.5 mg one tablet 30 minutes before MRI, may repeat x 1, must have a   12. Follow up with neurology in 3 months or sooner if needed

## 2022-06-08 NOTE — TELEPHONE ENCOUNTER
Pt voiced understanding  ----- Message from Donovan Moreno NP sent at 6/8/2022  5:17 PM CDT -----  Please notify patient his labs looked good, thanks

## 2022-06-23 ENCOUNTER — TELEPHONE (OUTPATIENT)
Dept: NEUROLOGY | Facility: CLINIC | Age: 72
End: 2022-06-23
Payer: COMMERCIAL

## 2022-06-23 NOTE — TELEPHONE ENCOUNTER
Called and informed pt of MRI Lumbar Spine results that shower mild degenerative changes. EMG/NCS of the ilan lower ext's was a limited study due to significant edema up to the knee. F/U EMG/NCS is recommended when edema subsides. Pt voiced understanding by verbal return.              ----- Message from Donovan Moreno NP sent at 6/23/2022  2:46 PM CDT -----  Please let patient know the mri of the lumbar showed mild degenerative changes, thanks

## 2022-06-29 ENCOUNTER — CLINICAL SUPPORT (OUTPATIENT)
Dept: REHABILITATION | Facility: HOSPITAL | Age: 72
End: 2022-06-29
Payer: COMMERCIAL

## 2022-06-29 DIAGNOSIS — R26.89 BALANCE PROBLEMS: ICD-10-CM

## 2022-06-29 DIAGNOSIS — R42 DIZZINESS: Chronic | ICD-10-CM

## 2022-06-29 DIAGNOSIS — R29.898 WEAKNESS OF BOTH LOWER EXTREMITIES: ICD-10-CM

## 2022-06-29 PROBLEM — H83.2X9 VESTIBULAR HYPOFUNCTION: Status: RESOLVED | Noted: 2021-09-22 | Resolved: 2022-06-29

## 2022-06-29 PROCEDURE — 97161 PT EVAL LOW COMPLEX 20 MIN: CPT
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
## 2022-07-07 ENCOUNTER — CLINICAL SUPPORT (OUTPATIENT)
Dept: REHABILITATION | Facility: HOSPITAL | Age: 72
End: 2022-07-07
Payer: COMMERCIAL

## 2022-07-07 DIAGNOSIS — R42 DIZZINESS: Chronic | ICD-10-CM

## 2022-07-07 DIAGNOSIS — R26.89 BALANCE PROBLEMS: ICD-10-CM

## 2022-07-07 DIAGNOSIS — R29.898 WEAKNESS OF BOTH LOWER EXTREMITIES: Primary | ICD-10-CM

## 2022-07-07 PROCEDURE — 97110 THERAPEUTIC EXERCISES: CPT

## 2022-07-07 PROCEDURE — 97112 NEUROMUSCULAR REEDUCATION: CPT

## 2022-07-07 NOTE — PROGRESS NOTES
[facility] OUTPATIENT REHABILITATION  Physical Therapy Treatment Note     Name: James Martinez  Clinic Number: [id number]    Therapy Diagnosis:   Encounter Diagnoses   Name Primary?    Weakness of both lower extremities Yes    Dizziness     Balance problems      Physician: Donovan Moreno NP    Visit Date: 7/7/2022    Physician Orders: PT Eval and Treat  Medical Diagnosis from Referral: R42 (ICD-10-CM) - Dizziness; R29.898 (ICD-10-CM) - Weakness of both lower extremities; R26.89 (ICD-10-CM) - Balance problems  Evaluation Date: 6/29/2022  Authorization Period Expiration: 9/4/2022  Plan of Care Expiration: 6/29/2022 to 8/19/2022  Updated Plan of Care Due: 7/29/2022  Visit # / Visits authorized: 2/9 (indluding initial evaluation)    Time In: 1105  Time Out: 1200  Total Billable Time: 55 minutes    Precautions: Standard, Diabetes and blood thinners  Functional Level Prior to Evaluation: independent with all functional mobility without assistive device; assist from friend with ADLs and self-care    Subjective     Pt reports: He is doing well. He has been working on his home exercise program. He has been having difficulty performing bridges secondary to associated shoulder pain caused by the surface he is performing them on. Patient reports he will continue to work on finding a better surface to perform bridges on.    He was compliant with home exercise program.  Response to previous treatment: This is patient's first treatment session following initial evaluation.    Pain: 0/10  Location: N/A    Objective     Reg received therapeutic exercises to develop strength, endurance, ROM, flexibility, posture and core stabilization for 45 minutes including:    NuStep: level 5; 7 minutes  Calf stretch on slant board: 2 minutes  Hamstring stretch on step: 3 x 30 second holds  Leg press: 5 plates; x 30 reps  Cybex hamstring curls: 5 plates; x 30 reps  Cybex knee extensions: 2 plates; x 30 reps  Cybex hip  abduction: 2 plates; x 20 reps each  Seated thoracic extension stretch over back of chair: x 4 reps with 15 second holds    Reg participated in neuromuscular re-education activities to improve: Balance, Coordination, Proprioception and Posture for 10 minutes. The following activities were included:    Standing scapular retraction with bilateral upper extremity extension: green theraband; 3 x 10 reps  Double limb support on foam without upper extremity support with eyes closed: 1 minute  Beam forward: 1 lap    Home Exercises Provided and Patient Education Provided     Education provided: Patient educated on the importance of completing skilled physical therapy treatment and home exercise program as prescribed to maximize functional gains.     Written Home Exercises Provided: Patient instructed to cont prior HEP. Exercises were reviewed and Reg was able to demonstrate them prior to the end of the session.  Reg demonstrated good  understanding of the education provided.     See EMR under Patient Instructions for exercises provided prior visit.    Assessment     Patient with good effort throughout treatment. No significant difficulty completing therapeutic exercise or neuromuscular re-education. Patient required minimal assist to complete balance beam activity. Patient required verbal cues to achieve bilateral heel strike with gait. Physical Therapist will continue to progress there, neuromuscular re-education, therapeutic activities, and gait training as able.    Reg Is progressing well towards his goals.   Pt prognosis is Good.     Pt will continue to benefit from skilled outpatient physical therapy to address the deficits listed in the problem list box on initial evaluation, provide pt/family education, and to maximize pt's level of independence in the home and community environment.     Pt's spiritual, cultural, and educational needs considered and pt agreeable to plan of care and goals.     Anticipated barriers to  physical therapy: compliance with home exercise program    Goals:    Short Term Goals:  1. Patient will demonstrate independence with home exercise program to ensure carryover of treatment.  2. Patient will perform supine to/from sit with modified independence within 4 seconds each to improve independence and safety with bed mobility.  3. Patient will perform sit to/from stand with contact guard assist without upper extremity support to improve independence and safety with transfers.  4. Patient will improve bilateral lower extremity strength to 4+/5 to improve independence and safety with bed mobility, transfers, and gait.  5. Patient will improve Tinetti Balance + Gait score to 24/28 to improve dynamic standing balance and lower fall risk.   6. Patient will ambulate 300 feet without assistive device with supervision with improved upright posture, bilateral foot clearance, step lengths, and arm swing to improve independence and safety with gait.      Long Term Goals:  1. Patient will perform sit to/from stand with independence without upper extremity support to improve independence and safety with transfers.  2. Patient will improve bilateral lower extremity strength to 5/5 to improve independence and safety with bed mobility, transfers, and gait.  3. Patient will ambulate 600 feet without assistive device with independence with improved upright posture, bilateral foot clearance, step lengths, and arm swing including up/down curbs and ramps to improve independence and safety with community ambulation.  4. Patient will report ability to walk 2 miles in neighborhood without stopping secondary to fatigue to demonstrate improved cardiovascular endurance.    Plan     Patient will continue to benefit from skilled physical therapy treatment as prescribed working towards goals listed above to maximize functional potential.       Alex Lopes, PT, DPT  7/7/2022

## 2022-07-19 ENCOUNTER — CLINICAL SUPPORT (OUTPATIENT)
Dept: REHABILITATION | Facility: HOSPITAL | Age: 72
End: 2022-07-19
Payer: COMMERCIAL

## 2022-07-19 DIAGNOSIS — R26.89 BALANCE PROBLEMS: ICD-10-CM

## 2022-07-19 DIAGNOSIS — R42 DIZZINESS: ICD-10-CM

## 2022-07-19 DIAGNOSIS — R29.898 WEAKNESS OF BOTH LOWER EXTREMITIES: Primary | ICD-10-CM

## 2022-07-19 PROCEDURE — 97112 NEUROMUSCULAR REEDUCATION: CPT

## 2022-07-19 PROCEDURE — 97110 THERAPEUTIC EXERCISES: CPT

## 2022-07-19 NOTE — PROGRESS NOTES
[facility] OUTPATIENT REHABILITATION  Physical Therapy Treatment Note     Name: James Martinez  Clinic Number: [id number]    Therapy Diagnosis:   Encounter Diagnoses   Name Primary?    Dizziness      Weakness of both lower extremities      Balance problems    Physician: Donovan Moreno NP    Visit Date: 7/19/2022    Physician Orders: PT Eval and Treat  Medical Diagnosis from Referral: R42 (ICD-10-CM) - Dizziness; R29.898 (ICD-10-CM) - Weakness of both lower extremities; R26.89 (ICD-10-CM) - Balance problems  Evaluation Date: 6/29/2022  Authorization Period Expiration: 9/4/2022  Plan of Care Expiration: 6/29/2022 to 8/19/2022  Updated Plan of Care Due: 7/29/2022  Visit # / Visits authorized: 3/9 (indluding initial evaluation)    Time In: 1100  Time Out: 1155  Total Billable Time: 55 minutes    Precautions: Standard, Diabetes and blood thinners  Functional Level Prior to Evaluation: independent with all functional mobility without assistive device; assist from friend with ADLs and self-care    Subjective     Pt reports: He is doing well. He feels as though he is moving a bit more swiftly this morning.     He was compliant with home exercise program.  Response to previous treatment: good    Pain: 0/10  Location: N/A    Objective     Reg received therapeutic exercises to develop strength, endurance, ROM, flexibility, posture and core stabilization for 35 minutes including:    NuStep: level 5; 7 minutes  Calf stretch on slant board: 2 minutes  Hamstring stretch on step: 3 x 30 second holds  Leg press: 6 plates; x 30 reps  Cybex hamstring curls: 5 plates; x 30 reps  Cybex knee extensions: 2 plates; x 30 reps  Cybex hip abduction: 2 plates; x 20 reps each  Seated thoracic extension stretch over back of chair: x 4 reps with 15 second holds  Supine bilateral shoulder flexion stretch with cane: 3 lbs; 10 x 10 second holds    Reg participated in neuromuscular re-education activities to improve: Balance,  Coordination, Proprioception and Posture for 20 minutes. The following activities were included:    Standing scapular retraction with bilateral upper extremity extension: green theraband; 3 x 10 reps  Alternate heel taps on 12-inch step: 2 x 15 reps each  Beam forward: 1 lap  High knee walking in parallel bars with emphasis on heel strike: 2 laps    Home Exercises Provided and Patient Education Provided     Education provided: Patient educated on the importance of completing skilled physical therapy treatment and home exercise program as prescribed to maximize functional gains.     Written Home Exercises Provided: Patient instructed to cont prior HEP. Exercises were reviewed and Reg was able to demonstrate them prior to the end of the session. Reg demonstrated good  understanding of the education provided.     See EMR under Patient Instructions for exercises provided prior visit.    Assessment     Patient with good effort throughout treatment. No significant difficulty completing therapeutic exercise or neuromuscular re-education. Patient required minimal assist to complete balance beam activity. Patient required verbal cues to achieve bilateral heel strike with gait. Physical Therapist will continue to progress there, neuromuscular re-education, therapeutic activities, and gait training as able.    Reg Is progressing well towards his goals.   Pt prognosis is Good.     Pt will continue to benefit from skilled outpatient physical therapy to address the deficits listed in the problem list box on initial evaluation, provide pt/family education, and to maximize pt's level of independence in the home and community environment.     Pt's spiritual, cultural, and educational needs considered and pt agreeable to plan of care and goals.     Anticipated barriers to physical therapy: compliance with home exercise program    Goals:    Short Term Goals:  1. Patient will demonstrate independence with home exercise program to  ensure carryover of treatment.  2. Patient will perform supine to/from sit with modified independence within 4 seconds each to improve independence and safety with bed mobility.  3. Patient will perform sit to/from stand with contact guard assist without upper extremity support to improve independence and safety with transfers.  4. Patient will improve bilateral lower extremity strength to 4+/5 to improve independence and safety with bed mobility, transfers, and gait.  5. Patient will improve Tinetti Balance + Gait score to 24/28 to improve dynamic standing balance and lower fall risk.   6. Patient will ambulate 300 feet without assistive device with supervision with improved upright posture, bilateral foot clearance, step lengths, and arm swing to improve independence and safety with gait.      Long Term Goals:  1. Patient will perform sit to/from stand with independence without upper extremity support to improve independence and safety with transfers.  2. Patient will improve bilateral lower extremity strength to 5/5 to improve independence and safety with bed mobility, transfers, and gait.  3. Patient will ambulate 600 feet without assistive device with independence with improved upright posture, bilateral foot clearance, step lengths, and arm swing including up/down curbs and ramps to improve independence and safety with community ambulation.  4. Patient will report ability to walk 2 miles in neighborhood without stopping secondary to fatigue to demonstrate improved cardiovascular endurance.    Plan     Patient will continue to benefit from skilled physical therapy treatment as prescribed working towards goals listed above to maximize functional potential.       Alex Lopes, PT, DPT  7/19/2022

## 2022-07-27 ENCOUNTER — CLINICAL SUPPORT (OUTPATIENT)
Dept: REHABILITATION | Facility: HOSPITAL | Age: 72
End: 2022-07-27
Payer: COMMERCIAL

## 2022-07-27 DIAGNOSIS — R29.898 WEAKNESS OF BOTH LOWER EXTREMITIES: Primary | ICD-10-CM

## 2022-07-27 DIAGNOSIS — R42 DIZZINESS: ICD-10-CM

## 2022-07-27 DIAGNOSIS — R26.89 BALANCE PROBLEMS: ICD-10-CM

## 2022-07-27 PROCEDURE — 97112 NEUROMUSCULAR REEDUCATION: CPT

## 2022-07-27 PROCEDURE — 97110 THERAPEUTIC EXERCISES: CPT

## 2022-07-27 NOTE — PROGRESS NOTES
[facility] OUTPATIENT REHABILITATION  Physical Therapy Updated Plan of Care     Name: James Martinez  Clinic Number: [id number]    Therapy Diagnosis:   Encounter Diagnoses   Name Primary?    Dizziness      Weakness of both lower extremities      Balance problems    Physician: Donovan Moreno NP    Visit Date: 7/27/2022    Physician Orders: PT Eval and Treat  Medical Diagnosis from Referral: R42 (ICD-10-CM) - Dizziness; R29.898 (ICD-10-CM) - Weakness of both lower extremities; R26.89 (ICD-10-CM) - Balance problems  Evaluation Date: 6/29/2022  Authorization Period Expiration: 9/4/2022  Plan of Care Expiration: 6/29/2022 to 8/19/2022  Updated Plan of Care Due: 7/29/2022 ***  Visit # / Visits authorized: 3/9 (indluding initial evaluation) ***    Time In: ***  Time Out: ***  Total Billable Time: *** minutes    Precautions: Standard, Diabetes and blood thinners  Functional Level Prior to Evaluation: independent with all functional mobility without assistive device; assist from friend with ADLs and self-care    Subjective     Pt reports: He is doing well. He feels as though he is moving a bit more swiftly this morning. ***    He was compliant with home exercise program.  Response to previous treatment: good    Pain: 0/10  Location: N/A    Objective     Reg received therapeutic exercises to develop strength, endurance, ROM, flexibility, posture and core stabilization for *** minutes including:    NuStep: level 5; 7 minutes  Calf stretch on slant board: 2 minutes  Hamstring stretch on step: 3 x 30 second holds  Leg press: 6 plates; x 30 reps  Cybex hamstring curls: 5 plates; x 30 reps  Cybex knee extensions: 2 plates; x 30 reps  Cybex hip abduction: 2 plates; x 20 reps each  Seated thoracic extension stretch over back of chair: x 4 reps with 15 second holds  Supine bilateral shoulder flexion stretch with cane: 3 lbs; 10 x 10 second holds    Reg participated in neuromuscular re-education activities to  improve: Balance, Coordination, Proprioception and Posture for *** minutes. The following activities were included:    Standing scapular retraction with bilateral upper extremity extension: green theraband; 3 x 10 reps  Alternate heel taps on 12-inch step: 2 x 15 reps each  Beam forward: 1 lap  High knee walking in parallel bars with emphasis on heel strike: 2 laps    Home Exercises Provided and Patient Education Provided     Education provided: Patient educated on the importance of completing skilled physical therapy treatment and home exercise program as prescribed to maximize functional gains.     Written Home Exercises Provided: Patient instructed to cont prior HEP. Exercises were reviewed and Reg was able to demonstrate them prior to the end of the session. Reg demonstrated good  understanding of the education provided.     See EMR under Patient Instructions for exercises provided prior visit.    Assessment     Patient with good effort throughout treatment. No significant difficulty completing therapeutic exercise or neuromuscular re-education. Patient required minimal assist to complete balance beam activity. Patient required verbal cues to achieve bilateral heel strike with gait. Physical Therapist will continue to progress there, neuromuscular re-education, therapeutic activities, and gait training as able. ***    Reg Is progressing well towards his goals.   Pt prognosis is Good.     Pt will continue to benefit from skilled outpatient physical therapy to address the deficits listed in the problem list box on initial evaluation, provide pt/family education, and to maximize pt's level of independence in the home and community environment.     Pt's spiritual, cultural, and educational needs considered and pt agreeable to plan of care and goals.     Anticipated barriers to physical therapy: compliance with home exercise program    Goals:    Short Term Goals:  1. Patient will demonstrate independence with home  exercise program to ensure carryover of treatment. [***]  2. Patient will perform supine to/from sit with modified independence within 4 seconds each to improve independence and safety with bed mobility. [***]  3. Patient will perform sit to/from stand with contact guard assist without upper extremity support to improve independence and safety with transfers. [***]  4. Patient will improve bilateral lower extremity strength to 4+/5 to improve independence and safety with bed mobility, transfers, and gait. [***]  5. Patient will improve Tinetti Balance + Gait score to 24/28 to improve dynamic standing balance and lower fall risk. [***]  6. Patient will ambulate 300 feet without assistive device with supervision with improved upright posture, bilateral foot clearance, step lengths, and arm swing to improve independence and safety with gait. [***]     Long Term Goals:  1. Patient will perform sit to/from stand with independence without upper extremity support to improve independence and safety with transfers. [***]  2. Patient will improve bilateral lower extremity strength to 5/5 to improve independence and safety with bed mobility, transfers, and gait. [***]  3. Patient will ambulate 600 feet without assistive device with independence with improved upright posture, bilateral foot clearance, step lengths, and arm swing including up/down curbs and ramps to improve independence and safety with community ambulation. [***]  4. Patient will report ability to walk 2 miles in neighborhood without stopping secondary to fatigue to demonstrate improved cardiovascular endurance. [***]    Reasons for Recertification of Therapy: ***    Plan     Updated Certification Period: 7/27/2022 to 8/19/2022  Recommended Treatment Plan: 2 times per week for 3 weeks: Aquatic Therapy, Gait Training, Neuromuscular Re-ed, Patient Education, Therapeutic Activities and Therapeutic Exercise  Other Recommendations: N/A    Alex Lopes, PT,  DPT  7/27/2022      I CERTIFY THE NEED FOR THESE SERVICES FURNISHED UNDER THIS PLAN OF TREATMENT AND WHILE UNDER MY CARE.    Physician's comments:

## 2022-07-27 NOTE — PLAN OF CARE
[facility] OUTPATIENT REHABILITATION  Physical Therapy Updated Plan of Care     Name: James Martinez  Clinic Number: [id number]    Therapy Diagnosis:   Encounter Diagnoses   Name Primary?    Dizziness      Weakness of both lower extremities      Balance problems    Physician: Donovan Moreno NP    Visit Date: 7/27/2022    Physician Orders: PT Eval and Treat  Medical Diagnosis from Referral: R42 (ICD-10-CM) - Dizziness; R29.898 (ICD-10-CM) - Weakness of both lower extremities; R26.89 (ICD-10-CM) - Balance problems  Evaluation Date: 6/29/2022  Authorization Period Expiration: 9/4/2022  Plan of Care Expiration: 6/29/2022 to 8/19/2022  Updated Plan of Care Due: 8/19/2022  Visit # / Visits authorized: 4/9 (indluding initial evaluation)    Time In: 1100  Time Out: 1158  Total Billable Time: 58 minutes    Precautions: Standard, Diabetes and blood thinners  Functional Level Prior to Evaluation: independent with all functional mobility without assistive device; assist from friend with ADLs and self-care    Subjective     Pt reports: He is doing well. No new issues to report.    He was compliant with home exercise program.  Response to previous treatment: good    Pain: 0/10  Location: N/A    Objective     Reg received therapeutic exercises to develop strength, endurance, ROM, flexibility, posture and core stabilization for 50 minutes including:    NuStep: level 5; 7 minutes  Calf stretch on slant board: 2 minutes  Hamstring stretch on step: 3 x 30 second holds  Leg press: 7 plates; x 30 reps  Cybex hamstring curls: 5 plates; x 30 reps  Cybex knee extensions: 3 plates; x 30 reps  Cybex hip abduction: 2 plates; x 30 reps each  Seated thoracic extension stretch over back of chair: x 5 reps with 15 second holds  Supine bilateral shoulder flexion stretch with cane: 3 lbs; 10 x 10 second holds    Reg participated in neuromuscular re-education activities to improve: Balance, Coordination, Proprioception and  Posture for 8 minutes. The following activities were included:    Beam forward/lateral: 1 lap each (minimal assist)    High knee walking in parallel bars with emphasis on heel strike: 2 laps (not performed)  Alternate heel taps on 12-inch step: 2 x 15 reps each (not performed)  Standing scapular retraction with bilateral upper extremity extension: green theraband; 3 x 10 reps (not performed)    TINETTI BALANCE ASSESSMENT TOOL    BALANCE SECTION  Patient is seated in hard, armless chair.    1.Sitting Balance: Steady; safe = 1  2.Rises from chair: Able, without using arms = 2  3.Attempts to arise: Able to arise, 1 attempt = 2  4.Immediate standing Balance (first 5 seconds): Steady without walker or other support = 2  5.Standing balance: Narrow stance without support = 2  6.Nudged: Steady = 2  7.Eyes closed: Steady = 1  8.Turning 360 degrees: Discontinuous steps = 0 and Steady = 1  9.Sitting Down: Safe, smooth motion = 2    Balance Score: 15/16    GAIT SECTION  Patient stands with therapist, walks across room (+/- aids), first at usual pace, then at rapid pace.    10.Initiation of Gait (Immediately after told to go.): No hesitancy = 1  11.Step length and height: Step through R=1 and Step through L=1  12.Foot Clearance: Foot drop=0  13.Step symmetry: Right & Left step length appear equal = 1  14.Step continuity: Steps appear continuous = 1  15.Path: Straight without walking aid = 2  16.Trunk: No sway, but flexion of knees or back or spreads arm out while walking = 1  17.Walking Time: Heels amost touching while walking = 1    Gait Score: 9/12  Total Score (Balance + Gait Score): 24/28     Risk Indicators:    Tinetti Tool Score   Risk of Falls   ?18    High   19-23   Moderate   ?24   Low    Home Exercises Provided and Patient Education Provided     Education provided: Patient educated on the importance of completing skilled physical therapy treatment and home exercise program as prescribed to maximize functional gains.      Written Home Exercises Provided: Patient instructed to cont prior HEP. Exercises were reviewed and Reg was able to demonstrate them prior to the end of the session. Reg demonstrated good  understanding of the education provided.     See EMR under Patient Instructions for exercises provided prior visit.    Assessment     Patient with good effort throughout treatment. No significant difficulty completing therapeutic exercise or neuromuscular re-education. Patient required minimal assist to complete balance beam activity and with a tendency to turn feet to right when walking laterally to right on beam. Patient required verbal cues to achieve bilateral heel strike with gait. Patient is able to demonstrate a near normal gait pattern but reverts to a shuffling gait when not directing attention towards his actions. Patient encouraged to increase the amount of attention he gives to his gait pattern in an attempt to create a new habit. Physical Therapist will continue to progress therapeutic exercise, neuromuscular re-education, therapeutic activities, and gait training as able.    Reg Is progressing well towards his goals.   Pt prognosis is Good.     Pt will continue to benefit from skilled outpatient physical therapy to address the deficits listed in the problem list box on initial evaluation, provide pt/family education, and to maximize pt's level of independence in the home and community environment.     Pt's spiritual, cultural, and educational needs considered and pt agreeable to plan of care and goals.     Anticipated barriers to physical therapy: compliance with home exercise program    Goals:    Short Term Goals:  1. Patient will demonstrate independence with home exercise program to ensure carryover of treatment. [met]  2. Patient will perform supine to/from sit with modified independence within 4 seconds each to improve independence and safety with bed mobility. [not met: 6 seconds]  3. Patient will perform  sit to/from stand with contact guard assist without upper extremity support to improve independence and safety with transfers. [met: standby assist]  4. Patient will improve bilateral lower extremity strength to 4+/5 to improve independence and safety with bed mobility, transfers, and gait. [met: grossly 4+/5]  5. Patient will improve Tinetti Balance + Gait score to 24/28 to improve dynamic standing balance and lower fall risk. [met: 24/28]  6. Patient will ambulate 300 feet without assistive device with supervision with improved upright posture, bilateral foot clearance, step lengths, and arm swing to improve independence and safety with gait. [not met: persistent rounded shoulders, forward head, shuffling gait, short step lengths, and minimal arm swing without verbal cues; able to correct with verbal cues]     Long Term Goals:  1. Patient will perform sit to/from stand with independence without upper extremity support to improve independence and safety with transfers. [not met: standby assist]  2. Patient will improve bilateral lower extremity strength to 5/5 to improve independence and safety with bed mobility, transfers, and gait. [not met: grossly 4+/5]  3. Patient will ambulate 600 feet without assistive device with independence with improved upright posture, bilateral foot clearance, step lengths, and arm swing including up/down curbs and ramps to improve independence and safety with community ambulation. [not met: persistent rounded shoulders, forward head, shuffling gait, short step lengths, and minimal arm swing without verbal cues; able to correct with verbal cues]  4. Patient will report ability to walk 2 miles in neighborhood without stopping secondary to fatigue to demonstrate improved cardiovascular endurance. [not met]    Reasons for Recertification of Therapy: Patient has not met all goals.     Plan     Updated Certification Period: 7/27/2022 to 8/19/2022  Recommended Treatment Plan: 2 times per  week for 3 weeks: Aquatic Therapy, Gait Training, Neuromuscular Re-ed, Patient Education, Therapeutic Activities and Therapeutic Exercise  Other Recommendations: N/A    Alex Lopes, PT, DPT  7/27/2022      I CERTIFY THE NEED FOR THESE SERVICES FURNISHED UNDER THIS PLAN OF TREATMENT AND WHILE UNDER MY CARE.    Physician's comments:

## 2022-08-01 ENCOUNTER — CLINICAL SUPPORT (OUTPATIENT)
Dept: REHABILITATION | Facility: HOSPITAL | Age: 72
End: 2022-08-01
Payer: COMMERCIAL

## 2022-08-01 DIAGNOSIS — R26.89 BALANCE PROBLEMS: ICD-10-CM

## 2022-08-01 DIAGNOSIS — R29.898 WEAKNESS OF BOTH LOWER EXTREMITIES: Primary | ICD-10-CM

## 2022-08-01 DIAGNOSIS — R42 DIZZINESS: ICD-10-CM

## 2022-08-01 PROCEDURE — 97110 THERAPEUTIC EXERCISES: CPT

## 2022-08-01 PROCEDURE — 97112 NEUROMUSCULAR REEDUCATION: CPT

## 2022-08-01 NOTE — PROGRESS NOTES
[facility] OUTPATIENT REHABILITATION  Physical Therapy Treatment Note     Name: James Martinez  Clinic Number: [id number]    Therapy Diagnosis:   Encounter Diagnoses   Name Primary?    Dizziness      Weakness of both lower extremities      Balance problems    Physician: Donovan Moreno NP    Visit Date: 8/1/2022     Physician Orders: PT Eval and Treat  Medical Diagnosis from Referral: R42 (ICD-10-CM) - Dizziness; R29.898 (ICD-10-CM) - Weakness of both lower extremities; R26.89 (ICD-10-CM) - Balance problems  Evaluation Date: 6/29/2022  Authorization Period Expiration: 9/4/2022  Plan of Care Expiration: 6/29/2022 to 8/19/2022  Updated Plan of Care Due: 8/19/2022  Visit # / Visits authorized: 5/9 (indluding initial evaluation)    Time In: 1102  Time Out: 1159  Total Billable Time: 57 minutes    Precautions: Standard, Diabetes and blood thinners  Functional Level Prior to Evaluation: independent with all functional mobility without assistive device; assist from friend with ADLs and self-care    Subjective     Pt reports: He is doing well. No new issues to report. He has been increasing his focus on achieving bilateral heel strike.    He was compliant with home exercise program.  Response to previous treatment: good    Pain: 0/10  Location: N/A    Objective     Reg received therapeutic exercises to develop strength, endurance, ROM, flexibility, posture and core stabilization for 49 minutes including:    NuStep: level 5; 7 minutes  Calf stretch on slant board: 2 minutes  Hamstring stretch on step: 3 x 30 second holds  Leg press: 7 plates; x 30 reps  Cybex hamstring curls: 5 plates; x 30 reps  Cybex knee extensions: 3 plates; x 30 reps  Cybex hip abduction: 2 plates; x 30 reps each  Seated thoracic extension stretch over back of chair: x 5 reps with 15 second holds  Supine bilateral shoulder flexion stretch with cane: 3 lbs; 10 x 10 second holds    Reg participated in neuromuscular re-education activities to  improve: Balance, Coordination, Proprioception and Posture for 8 minutes. The following activities were included:    Alternate heel taps on 12-inch step: 2 x 15 reps each  Beam forward/lateral: 1 lap each (minimal assist)    High knee walking in parallel bars with emphasis on heel strike: 2 laps (not performed)  Standing scapular retraction with bilateral upper extremity extension: green theraband; 3 x 10 reps (not performed)    Home Exercises Provided and Patient Education Provided     Education provided: Patient educated on the importance of completing skilled physical therapy treatment and home exercise program as prescribed to maximize functional gains.     Written Home Exercises Provided: Patient instructed to cont prior HEP. Exercises were reviewed and Reg was able to demonstrate them prior to the end of the session. Reg demonstrated good  understanding of the education provided.     See EMR under Patient Instructions for exercises provided prior visit.    Assessment     Patient with good effort throughout treatment. No significant difficulty completing therapeutic exercise or neuromuscular re-education. Patient required minimal assist to complete balance beam activity and with a tendency to turn feet to right when walking laterally to right on beam. Patient required verbal cues to achieve bilateral heel strike with gait. Patient is able to demonstrate a near normal gait pattern but reverts to a shuffling gait when not directing attention towards his actions. Patient encouraged to increase the amount of attention he gives to his gait pattern in an attempt to create a new habit. Physical Therapist will continue to progress therapeutic exercise, neuromuscular re-education, therapeutic activities, and gait training as able.    Reg Is progressing well towards his goals.   Pt prognosis is Good.     Pt will continue to benefit from skilled outpatient physical therapy to address the deficits listed in the problem  list box on initial evaluation, provide pt/family education, and to maximize pt's level of independence in the home and community environment.     Pt's spiritual, cultural, and educational needs considered and pt agreeable to plan of care and goals.     Anticipated barriers to physical therapy: compliance with home exercise program    Goals:    Short Term Goals:  1. Patient will demonstrate independence with home exercise program to ensure carryover of treatment. [met]  2. Patient will perform supine to/from sit with modified independence within 4 seconds each to improve independence and safety with bed mobility. [not met: 6 seconds]  3. Patient will perform sit to/from stand with contact guard assist without upper extremity support to improve independence and safety with transfers. [met: standby assist]  4. Patient will improve bilateral lower extremity strength to 4+/5 to improve independence and safety with bed mobility, transfers, and gait. [met: grossly 4+/5]  5. Patient will improve Tinetti Balance + Gait score to 24/28 to improve dynamic standing balance and lower fall risk. [met: 24/28]  6. Patient will ambulate 300 feet without assistive device with supervision with improved upright posture, bilateral foot clearance, step lengths, and arm swing to improve independence and safety with gait. [not met: persistent rounded shoulders, forward head, shuffling gait, short step lengths, and minimal arm swing without verbal cues; able to correct with verbal cues]     Long Term Goals:  1. Patient will perform sit to/from stand with independence without upper extremity support to improve independence and safety with transfers. [not met: standby assist]  2. Patient will improve bilateral lower extremity strength to 5/5 to improve independence and safety with bed mobility, transfers, and gait. [not met: grossly 4+/5]  3. Patient will ambulate 600 feet without assistive device with independence with improved upright  posture, bilateral foot clearance, step lengths, and arm swing including up/down curbs and ramps to improve independence and safety with community ambulation. [not met: persistent rounded shoulders, forward head, shuffling gait, short step lengths, and minimal arm swing without verbal cues; able to correct with verbal cues]  4. Patient will report ability to walk 2 miles in neighborhood without stopping secondary to fatigue to demonstrate improved cardiovascular endurance. [not met]    Plan     Patient will continue to benefit from skilled physical therapy treatment as prescribed working towards goals listed above to maximize functional potential.       Alex Lopes, PT, DPT  8/1/2022

## 2022-08-03 ENCOUNTER — CLINICAL SUPPORT (OUTPATIENT)
Dept: REHABILITATION | Facility: HOSPITAL | Age: 72
End: 2022-08-03
Payer: COMMERCIAL

## 2022-08-03 DIAGNOSIS — R26.89 BALANCE PROBLEMS: ICD-10-CM

## 2022-08-03 DIAGNOSIS — R29.898 WEAKNESS OF BOTH LOWER EXTREMITIES: Primary | ICD-10-CM

## 2022-08-03 DIAGNOSIS — R42 DIZZINESS: ICD-10-CM

## 2022-08-03 PROCEDURE — 97112 NEUROMUSCULAR REEDUCATION: CPT

## 2022-08-03 PROCEDURE — 97110 THERAPEUTIC EXERCISES: CPT

## 2022-08-03 NOTE — PROGRESS NOTES
[facility]  Physical Therapy Treatment Note     Name: James Martinez  Clinic Number: [id number]    Therapy Diagnosis:   Encounter Diagnoses   Name Primary?    Dizziness      Weakness of both lower extremities      Balance problems    Physician: Donovan Moreno NP    Visit Date: 8/3/2022     Physician Orders: PT Eval and Treat  Medical Diagnosis from Referral: R42 (ICD-10-CM) - Dizziness; R29.898 (ICD-10-CM) - Weakness of both lower extremities; R26.89 (ICD-10-CM) - Balance problems  Evaluation Date: 6/29/2022  Authorization Period Expiration: 9/4/2022  Plan of Care Expiration: 6/29/2022 to 8/19/2022  Updated Plan of Care Due: 8/19/2022  Visit # / Visits authorized: 6/9 (indluding initial evaluation)    Time In: 1100  Time Out: 1153  Total Billable Time: 53 minutes    Precautions: Standard, Diabetes and blood thinners  Functional Level Prior to Evaluation: independent with all functional mobility without assistive device; assist from friend with ADLs and self-care    Subjective     Pt reports: He is doing well. No new issues to report.    He was compliant with home exercise program.  Response to previous treatment: good    Pain: 0/10  Location: N/A    Objective     Reg received therapeutic exercises to develop strength, endurance, ROM, flexibility, posture and core stabilization for 38 minutes including:    Bike: level 3; 7 minutes  Calf stretch on slant board: 2 minutes  Hamstring stretch on step: 3 x 30 second holds  Leg press: 7 plates; x 30 reps  Cybex hamstring curls: 6 plates; x 30 reps  Cybex knee extensions: 3 plates; x 30 reps  Cybex hip abduction: 2 plates; x 30 reps each  Supine bilateral shoulder flexion stretch with cane: 3 lbs; 10 x 10 second holds  Ankle plantarflexion/dorsiflexion on Rock Disc with bilateral upper extremity support: x 30 reps    Seated thoracic extension stretch over back of chair: x 5 reps with 15 second holds (not performed)  NuStep: level 5;  7 minutes (not performed)    Reg participated in neuromuscular re-education activities to improve: Balance, Coordination, Proprioception and Posture for 10 minutes. The following activities were included:    Cybex rows: 3 plates; x 30 reps  Alternate heel taps on 12-inch step: x 20 reps each    Beam forward/lateral: 1 lap each (minimal assist) (not performed)  High knee walking in parallel bars with emphasis on heel strike: 2 laps (not performed)  Standing scapular retraction with bilateral upper extremity extension: green theraband; 3 x 10 reps (not performed)    Reg participated in gait training to improve functional mobility and safety for 5 minutes, including:    Gait around rehab facility x 200 feet without assistive device with emphasis on maintaining upright posture, achieving bilateral heel strike, and clearing bilateral feet    Home Exercises Provided and Patient Education Provided     Education provided: Patient educated on the importance of completing skilled physical therapy treatment and home exercise program as prescribed to maximize functional gains.     Written Home Exercises Provided: Patient instructed to cont prior HEP. Exercises were reviewed and Reg was able to demonstrate them prior to the end of the session. Reg demonstrated good  understanding of the education provided.     See EMR under Patient Instructions for exercises provided prior visit.    Assessment     Patient ambulates into clinic with a shuffling gait pattern. Patient with good effort throughout treatment. No significant difficulty completing therapeutic exercise or neuromuscular re-education. Patient required verbal cues to achieve bilateral heel strike with gait. Patient is able to demonstrate a near normal gait pattern but reverts to a shuffling gait when not directing attention towards his actions. Patient encouraged to increase the amount of attention he gives to his gait pattern in an attempt to create a new habit. Physical  Therapist will continue to progress therapeutic exercise, neuromuscular re-education, therapeutic activities, and gait training as able.    Reg Is progressing well towards his goals.   Pt prognosis is Good.     Pt will continue to benefit from skilled outpatient physical therapy to address the deficits listed in the problem list box on initial evaluation, provide pt/family education, and to maximize pt's level of independence in the home and community environment.     Pt's spiritual, cultural, and educational needs considered and pt agreeable to plan of care and goals.     Anticipated barriers to physical therapy: compliance with home exercise program    Goals:    Short Term Goals:  1. Patient will demonstrate independence with home exercise program to ensure carryover of treatment. [met]  2. Patient will perform supine to/from sit with modified independence within 4 seconds each to improve independence and safety with bed mobility. [not met: 6 seconds]  3. Patient will perform sit to/from stand with contact guard assist without upper extremity support to improve independence and safety with transfers. [met: standby assist]  4. Patient will improve bilateral lower extremity strength to 4+/5 to improve independence and safety with bed mobility, transfers, and gait. [met: grossly 4+/5]  5. Patient will improve Tinetti Balance + Gait score to 24/28 to improve dynamic standing balance and lower fall risk. [met: 24/28]  6. Patient will ambulate 300 feet without assistive device with supervision with improved upright posture, bilateral foot clearance, step lengths, and arm swing to improve independence and safety with gait. [not met: persistent rounded shoulders, forward head, shuffling gait, short step lengths, and minimal arm swing without verbal cues; able to correct with verbal cues]     Long Term Goals:  1. Patient will perform sit to/from stand with independence without upper extremity support to improve  independence and safety with transfers. [not met: standby assist]  2. Patient will improve bilateral lower extremity strength to 5/5 to improve independence and safety with bed mobility, transfers, and gait. [not met: grossly 4+/5]  3. Patient will ambulate 600 feet without assistive device with independence with improved upright posture, bilateral foot clearance, step lengths, and arm swing including up/down curbs and ramps to improve independence and safety with community ambulation. [not met: persistent rounded shoulders, forward head, shuffling gait, short step lengths, and minimal arm swing without verbal cues; able to correct with verbal cues]  4. Patient will report ability to walk 2 miles in neighborhood without stopping secondary to fatigue to demonstrate improved cardiovascular endurance. [not met]    Plan     Patient will continue to benefit from skilled physical therapy treatment as prescribed working towards goals listed above to maximize functional potential.       Alex Lopes, PT, DPT  8/3/2022

## 2022-08-08 ENCOUNTER — CLINICAL SUPPORT (OUTPATIENT)
Dept: REHABILITATION | Facility: HOSPITAL | Age: 72
End: 2022-08-08
Payer: COMMERCIAL

## 2022-08-08 DIAGNOSIS — R26.89 BALANCE PROBLEMS: ICD-10-CM

## 2022-08-08 DIAGNOSIS — R42 DIZZINESS: ICD-10-CM

## 2022-08-08 DIAGNOSIS — R29.898 WEAKNESS OF BOTH LOWER EXTREMITIES: Primary | ICD-10-CM

## 2022-08-08 PROCEDURE — 97110 THERAPEUTIC EXERCISES: CPT

## 2022-08-08 PROCEDURE — 97112 NEUROMUSCULAR REEDUCATION: CPT

## 2022-08-08 NOTE — PROGRESS NOTES
[facility]  Physical Therapy Treatment Note     Name: James Martinez  Clinic Number: [id number]    Therapy Diagnosis:   Encounter Diagnoses   Name Primary?    Dizziness      Weakness of both lower extremities      Balance problems    Physician: Donovan Moreno NP    Visit Date: 8/8/2022     Physician Orders: PT Eval and Treat  Medical Diagnosis from Referral: R42 (ICD-10-CM) - Dizziness; R29.898 (ICD-10-CM) - Weakness of both lower extremities; R26.89 (ICD-10-CM) - Balance problems  Evaluation Date: 6/29/2022  Authorization Period Expiration: 9/4/2022  Plan of Care Expiration: 6/29/2022 to 8/19/2022  Updated Plan of Care Due: 8/19/2022  Visit # / Visits authorized: 7/9 (indluding initial evaluation)    Time In: 1100  Time Out: 1155  Total Billable Time: 55 minutes    Precautions: Standard, Diabetes and blood thinners  Functional Level Prior to Evaluation: independent with all functional mobility without assistive device; assist from friend with ADLs and self-care    Subjective     Pt reports: He is doing well. No new issues to report.    He was compliant with home exercise program.  Response to previous treatment: good    Pain: 0/10  Location: N/A    Objective     Reg received therapeutic exercises to develop strength, endurance, ROM, flexibility, posture and core stabilization for 45 minutes including:    NuStep: level 5; 7 minutes  Calf stretch on slant board: 2 minutes  Hamstring stretch on step: 3 x 30 second holds  Leg press: 7 plates; x 30 reps  Cybex hamstring curls: 6 plates; x 30 reps  Cybex knee extensions: 3 plates; x 30 reps  Cybex hip abduction: 2 plates; x 30 reps each  Supine bilateral shoulder flexion stretch with cane: 3 lbs; 10 x 10 second holds  Ankle plantarflexion/dorsiflexion on Rock Disc with bilateral upper extremity support: x 30 reps    Bike: level 3; 7 minutes (not performed)  Seated thoracic extension stretch over back of chair: x 5 reps with 15  second holds (not performed)    Reg participated in neuromuscular re-education activities to improve: Balance, Coordination, Proprioception and Posture for 10 minutes. The following activities were included:    High knee walking in parallel bars with emphasis on heel strike: 5 laps  Alternate heel taps on 12-inch step: x 20 reps each    Cybex rows: 3 plates; x 30 reps (not performed)  Beam forward/lateral: 1 lap each (minimal assist) (not performed)  Standing scapular retraction with bilateral upper extremity extension: green theraband; 3 x 10 reps (not performed)    Reg participated in gait training to improve functional mobility and safety for 0 minutes, including:    Gait around rehab facility x 200 feet without assistive device with emphasis on maintaining upright posture, achieving bilateral heel strike, and clearing bilateral feet (not performed)    Home Exercises Provided and Patient Education Provided     Education provided: Patient educated on the importance of completing skilled physical therapy treatment and home exercise program as prescribed to maximize functional gains.     Written Home Exercises Provided: Patient instructed to cont prior HEP. Exercises were reviewed and Reg was able to demonstrate them prior to the end of the session. Reg demonstrated good  understanding of the education provided.     See EMR under Patient Instructions for exercises provided prior visit.    Assessment     Patient ambulates into clinic with improved gait mechanics (foot clearance) this treatment. Patient with good effort throughout treatment. No significant difficulty completing therapeutic exercise or neuromuscular re-education. Patient is beginning to require fewer verbal cues to achieve bilateral heel strike with gait and is beginning to correct his gait pattern more independently. Patient encouraged to continue focusing his attention on his gait pattern in an attempt to create a new habit. Physical Therapist will  continue to progress therapeutic exercise, neuromuscular re-education, therapeutic activities, and gait training as able.    Reg Is progressing well towards his goals.   Pt prognosis is Good.     Pt will continue to benefit from skilled outpatient physical therapy to address the deficits listed in the problem list box on initial evaluation, provide pt/family education, and to maximize pt's level of independence in the home and community environment.     Pt's spiritual, cultural, and educational needs considered and pt agreeable to plan of care and goals.     Anticipated barriers to physical therapy: compliance with home exercise program    Goals:    Short Term Goals:  1. Patient will demonstrate independence with home exercise program to ensure carryover of treatment. [met]  2. Patient will perform supine to/from sit with modified independence within 4 seconds each to improve independence and safety with bed mobility. [not met: 6 seconds]  3. Patient will perform sit to/from stand with contact guard assist without upper extremity support to improve independence and safety with transfers. [met: standby assist]  4. Patient will improve bilateral lower extremity strength to 4+/5 to improve independence and safety with bed mobility, transfers, and gait. [met: grossly 4+/5]  5. Patient will improve Tinetti Balance + Gait score to 24/28 to improve dynamic standing balance and lower fall risk. [met: 24/28]  6. Patient will ambulate 300 feet without assistive device with supervision with improved upright posture, bilateral foot clearance, step lengths, and arm swing to improve independence and safety with gait. [not met: persistent rounded shoulders, forward head, shuffling gait, short step lengths, and minimal arm swing without verbal cues; able to correct with verbal cues]     Long Term Goals:  1. Patient will perform sit to/from stand with independence without upper extremity support to improve independence and safety  with transfers. [not met: standby assist]  2. Patient will improve bilateral lower extremity strength to 5/5 to improve independence and safety with bed mobility, transfers, and gait. [not met: grossly 4+/5]  3. Patient will ambulate 600 feet without assistive device with independence with improved upright posture, bilateral foot clearance, step lengths, and arm swing including up/down curbs and ramps to improve independence and safety with community ambulation. [not met: persistent rounded shoulders, forward head, shuffling gait, short step lengths, and minimal arm swing without verbal cues; able to correct with verbal cues]  4. Patient will report ability to walk 2 miles in neighborhood without stopping secondary to fatigue to demonstrate improved cardiovascular endurance. [not met]    Plan     Patient will continue to benefit from skilled physical therapy treatment as prescribed working towards goals listed above to maximize functional potential.       Alex Lopes, PT, DPT  8/8/2022

## 2022-08-10 ENCOUNTER — CLINICAL SUPPORT (OUTPATIENT)
Dept: REHABILITATION | Facility: HOSPITAL | Age: 72
End: 2022-08-10
Payer: COMMERCIAL

## 2022-08-10 DIAGNOSIS — R26.89 BALANCE PROBLEMS: ICD-10-CM

## 2022-08-10 DIAGNOSIS — R29.898 WEAKNESS OF BOTH LOWER EXTREMITIES: Primary | ICD-10-CM

## 2022-08-10 DIAGNOSIS — R42 DIZZINESS: ICD-10-CM

## 2022-08-10 PROCEDURE — 97110 THERAPEUTIC EXERCISES: CPT

## 2022-08-10 NOTE — PROGRESS NOTES
[facility]  Physical Therapy Treatment Note     Name: James Martinez  Clinic Number: [id number]    Therapy Diagnosis:   Encounter Diagnoses   Name Primary?    Dizziness      Weakness of both lower extremities      Balance problems    Physician: Donovan Moreno NP    Visit Date: 8/10/2022     Physician Orders: PT Eval and Treat  Medical Diagnosis from Referral: R42 (ICD-10-CM) - Dizziness; R29.898 (ICD-10-CM) - Weakness of both lower extremities; R26.89 (ICD-10-CM) - Balance problems  Evaluation Date: 6/29/2022  Authorization Period Expiration: 9/4/2022  Plan of Care Expiration: 6/29/2022 to 8/19/2022  Updated Plan of Care Due: 8/19/2022  Visit # / Visits authorized: 8/9 (indluding initial evaluation)    Time In: 1100  Time Out: 1153  Total Billable Time: 53 minutes    Precautions: Standard, Diabetes and blood thinners  Functional Level Prior to Evaluation: independent with all functional mobility without assistive device; assist from friend with ADLs and self-care    Subjective     Pt reports: He is doing well. No new issues to report.    He was compliant with home exercise program.  Response to previous treatment: good    Pain: 0/10  Location: N/A    Objective     Reg received therapeutic exercises to develop strength, endurance, ROM, flexibility, posture and core stabilization for 53 minutes including:    NuStep: level 5; 7 minutes  Calf stretch on slant board: 2 minutes  Hamstring stretch on step: 3 x 30 second holds  Leg press: 7 plates; x 30 reps  Cybex hamstring curls: 6.5 plates; x 30 reps  Cybex knee extensions: 3 plates; x 30 reps  Cybex hip abduction: 2 plates; x 30 reps each  Supine bilateral shoulder flexion stretch with cane: 3 lbs; 10 x 10 second holds  Ankle plantarflexion/dorsiflexion on Rock Disc with bilateral upper extremity support: x 30 reps    Bike: level 3; 7 minutes (not performed)  Seated thoracic extension stretch over back of chair: x 5 reps with 15  second holds (not performed)    Reg participated in neuromuscular re-education activities to improve: Balance, Coordination, Proprioception and Posture for 0 minutes. The following activities were included:    High knee walking in parallel bars with emphasis on heel strike: 5 laps (not performed)  Alternate heel taps on 12-inch step: x 20 reps each (not performed)  Cybex rows: 3 plates; x 30 reps (not performed)  Beam forward/lateral: 1 lap each (minimal assist) (not performed)  Standing scapular retraction with bilateral upper extremity extension: green theraband; 3 x 10 reps (not performed)    Reg participated in gait training to improve functional mobility and safety for 0 minutes, including:    Gait around rehab facility x 200 feet without assistive device with emphasis on maintaining upright posture, achieving bilateral heel strike, and clearing bilateral feet (not performed)    Home Exercises Provided and Patient Education Provided     Education provided: Patient educated on the importance of completing skilled physical therapy treatment and home exercise program as prescribed to maximize functional gains.     Written Home Exercises Provided: Patient instructed to cont prior HEP. Exercises were reviewed and Reg was able to demonstrate them prior to the end of the session. Reg demonstrated good  understanding of the education provided.     See EMR under Patient Instructions for exercises provided prior visit.    Assessment     Patient ambulates into clinic with improved gait mechanics (foot clearance) this treatment. Patient with good effort throughout treatment. No significant difficulty completing therapeutic exercise or neuromuscular re-education. Patient is beginning to require fewer verbal cues to achieve bilateral heel strike with gait and is beginning to correct his gait pattern more independently. Patient encouraged to continue focusing his attention on his gait pattern in an attempt to create a new  habit. Patient with one approved physical therapy treatment session remaining. Physical Therapist will continue to progress therapeutic exercise, neuromuscular re-education, therapeutic activities, and gait training as able.    Reg Is progressing well towards his goals.   Pt prognosis is Good.     Pt will continue to benefit from skilled outpatient physical therapy to address the deficits listed in the problem list box on initial evaluation, provide pt/family education, and to maximize pt's level of independence in the home and community environment.     Pt's spiritual, cultural, and educational needs considered and pt agreeable to plan of care and goals.     Anticipated barriers to physical therapy: compliance with home exercise program    Goals:    Short Term Goals:  1. Patient will demonstrate independence with home exercise program to ensure carryover of treatment. [met]  2. Patient will perform supine to/from sit with modified independence within 4 seconds each to improve independence and safety with bed mobility. [not met: 6 seconds]  3. Patient will perform sit to/from stand with contact guard assist without upper extremity support to improve independence and safety with transfers. [met: standby assist]  4. Patient will improve bilateral lower extremity strength to 4+/5 to improve independence and safety with bed mobility, transfers, and gait. [met: grossly 4+/5]  5. Patient will improve Tinetti Balance + Gait score to 24/28 to improve dynamic standing balance and lower fall risk. [met: 24/28]  6. Patient will ambulate 300 feet without assistive device with supervision with improved upright posture, bilateral foot clearance, step lengths, and arm swing to improve independence and safety with gait. [not met: persistent rounded shoulders, forward head, shuffling gait, short step lengths, and minimal arm swing without verbal cues; able to correct with verbal cues]     Long Term Goals:  1. Patient will perform  sit to/from stand with independence without upper extremity support to improve independence and safety with transfers. [not met: standby assist]  2. Patient will improve bilateral lower extremity strength to 5/5 to improve independence and safety with bed mobility, transfers, and gait. [not met: grossly 4+/5]  3. Patient will ambulate 600 feet without assistive device with independence with improved upright posture, bilateral foot clearance, step lengths, and arm swing including up/down curbs and ramps to improve independence and safety with community ambulation. [not met: persistent rounded shoulders, forward head, shuffling gait, short step lengths, and minimal arm swing without verbal cues; able to correct with verbal cues]  4. Patient will report ability to walk 2 miles in neighborhood without stopping secondary to fatigue to demonstrate improved cardiovascular endurance. [not met]    Plan     Patient will continue to benefit from skilled physical therapy treatment as prescribed working towards goals listed above to maximize functional potential.       Alex Lopes, PT, DPT  8/10/2022

## 2022-08-15 ENCOUNTER — CLINICAL SUPPORT (OUTPATIENT)
Dept: REHABILITATION | Facility: HOSPITAL | Age: 72
End: 2022-08-15
Payer: COMMERCIAL

## 2022-08-15 DIAGNOSIS — R42 DIZZINESS: ICD-10-CM

## 2022-08-15 DIAGNOSIS — R29.898 WEAKNESS OF BOTH LOWER EXTREMITIES: Primary | ICD-10-CM

## 2022-08-15 DIAGNOSIS — R26.89 BALANCE PROBLEMS: ICD-10-CM

## 2022-08-15 PROCEDURE — 97110 THERAPEUTIC EXERCISES: CPT

## 2022-08-15 NOTE — PLAN OF CARE
[facility]  Physical Therapy Discharge Lalita     Name: James Martinez  Clinic Number: [id number]    Therapy Diagnosis:   Encounter Diagnoses   Name Primary?    Dizziness      Weakness of both lower extremities      Balance problems    Physician: Donovan Moreno NP    Visit Date: 8/15/2022     Physician Orders: PT Eval and Treat  Medical Diagnosis from Referral: R42 (ICD-10-CM) - Dizziness; R29.898 (ICD-10-CM) - Weakness of both lower extremities; R26.89 (ICD-10-CM) - Balance problems  Evaluation Date: 6/29/2022  Authorization Period Expiration: 9/4/2022  Plan of Care Expiration: 6/29/2022 to 8/19/2022  Updated Plan of Care Due: 8/19/2022  Visit # / Visits authorized: 9/9 (indluding initial evaluation)    Time In: 1100  Time Out: 1150  Total Billable Time: 50 minutes    Precautions: Standard, Diabetes and blood thinners  Functional Level Prior to Evaluation: independent with all functional mobility without assistive device; assist from friend with ADLs and self-care    Subjective     Pt reports: He is doing well. No new issues to report.    He was compliant with home exercise program.  Response to previous treatment: good    Pain: 0/10  Location: N/A    Objective     Reg received therapeutic exercises to develop strength, endurance, ROM, flexibility, posture and core stabilization for 45 minutes including:    NuStep: level 5; 7 minutes  Calf stretch on slant board: 2 minutes  Hamstring stretch on step: 3 x 30 second holds  Leg press: 7 plates; x 30 reps  Cybex hamstring curls: 6.5 plates; x 30 reps  Cybex knee extensions: 3 plates; x 30 reps  Cybex hip abduction: 2 plates; x 30 reps each  Ankle plantarflexion/dorsiflexion on Rock Disc with bilateral upper extremity support: x 30 reps    Reg participated in neuromuscular re-education activities to improve: Balance, Coordination, Proprioception and Posture for 5 minutes. The following activities were included:    Alternate  heel taps on 12-inch step: x 20 reps each    Home Exercises Provided and Patient Education Provided     Education provided: Patient educated on the importance of completing skilled physical therapy treatment and home exercise program as prescribed to maximize functional gains.     Written Home Exercises Provided: Patient instructed to cont prior HEP. Exercises were reviewed and Reg was able to demonstrate them prior to the end of the session. Reg demonstrated good  understanding of the education provided.     See EMR under Patient Instructions for exercises provided prior visit.    Assessment     Patient has progressed well with skilled physical therapy treatment as evidenced by the progress towards his goals listed above. Patient has completed the visits allowed by his insurance company and is ready for discharge with home exercise program. Patient encouraged to resume working out at the gym he is a member of ([facility]) ~3 times per week for strength maintenance.     Pt's spiritual, cultural, and educational needs considered and pt agreeable to plan of care and goals.     Anticipated barriers to physical therapy: compliance with home exercise program    Goals:    Short Term Goals:  1. Patient will demonstrate independence with home exercise program to ensure carryover of treatment. [met]  2. Patient will perform supine to/from sit with modified independence within 4 seconds each to improve independence and safety with bed mobility. [met]  3. Patient will perform sit to/from stand with contact guard assist without upper extremity support to improve independence and safety with transfers. [met]  4. Patient will improve bilateral lower extremity strength to 4+/5 to improve independence and safety with bed mobility, transfers, and gait. [met: grossly 4+/5]  5. Patient will improve Tinetti Balance + Gait score to 24/28 to improve dynamic standing balance and lower fall risk. [met:  24/28]  6. Patient will ambulate 300 feet without assistive device with supervision with improved upright posture, bilateral foot clearance, step lengths, and arm swing to improve independence and safety with gait. [met except for improved upright posture; rounded shoulders persist; minimal verbal cues required]     Long Term Goals:  1. Patient will perform sit to/from stand with independence without upper extremity support to improve independence and safety with transfers. [not met: standby assist]  2. Patient will improve bilateral lower extremity strength to 5/5 to improve independence and safety with bed mobility, transfers, and gait. [not met: grossly 4+/5]  3. Patient will ambulate 600 feet without assistive device with independence with improved upright posture, bilateral foot clearance, step lengths, and arm swing including up/down curbs and ramps to improve independence and safety with community ambulation. [met except for improved upright posture; rounded shoulders persist; minimal verbal cues required]  4. Patient will report ability to walk 2 miles in neighborhood without stopping secondary to fatigue to demonstrate improved cardiovascular endurance. [not met]    Discharge reason: Patient has completed allowable visits authorized by insurance.    Plan     This patient is discharged from Physical Therapy.    Alex Lopes, PT, DPT  8/15/2022

## 2022-09-21 ENCOUNTER — OFFICE VISIT (OUTPATIENT)
Dept: NEUROLOGY | Facility: CLINIC | Age: 72
End: 2022-09-21
Payer: COMMERCIAL

## 2022-09-21 VITALS
HEART RATE: 74 BPM | WEIGHT: 229 LBS | SYSTOLIC BLOOD PRESSURE: 134 MMHG | DIASTOLIC BLOOD PRESSURE: 80 MMHG | OXYGEN SATURATION: 98 % | BODY MASS INDEX: 29.39 KG/M2 | HEIGHT: 74 IN

## 2022-09-21 DIAGNOSIS — R26.89 BALANCE PROBLEMS: ICD-10-CM

## 2022-09-21 DIAGNOSIS — R20.2 PARESTHESIA: ICD-10-CM

## 2022-09-21 DIAGNOSIS — R42 DIZZINESS: Chronic | ICD-10-CM

## 2022-09-21 DIAGNOSIS — E55.9 VITAMIN D DEFICIENCY: ICD-10-CM

## 2022-09-21 DIAGNOSIS — R29.898 WEAKNESS OF BOTH LOWER EXTREMITIES: Primary | ICD-10-CM

## 2022-09-21 PROCEDURE — 3288F PR FALLS RISK ASSESSMENT DOCUMENTED: ICD-10-PCS | Mod: CPTII,,, | Performed by: NURSE PRACTITIONER

## 2022-09-21 PROCEDURE — 3008F PR BODY MASS INDEX (BMI) DOCUMENTED: ICD-10-PCS | Mod: CPTII,,, | Performed by: NURSE PRACTITIONER

## 2022-09-21 PROCEDURE — 3288F FALL RISK ASSESSMENT DOCD: CPT | Mod: CPTII,,, | Performed by: NURSE PRACTITIONER

## 2022-09-21 PROCEDURE — 1101F PT FALLS ASSESS-DOCD LE1/YR: CPT | Mod: CPTII,,, | Performed by: NURSE PRACTITIONER

## 2022-09-21 PROCEDURE — 99215 OFFICE O/P EST HI 40 MIN: CPT | Mod: PBBFAC | Performed by: NURSE PRACTITIONER

## 2022-09-21 PROCEDURE — 4010F ACE/ARB THERAPY RXD/TAKEN: CPT | Mod: CPTII,,, | Performed by: NURSE PRACTITIONER

## 2022-09-21 PROCEDURE — 99213 OFFICE O/P EST LOW 20 MIN: CPT | Mod: S$PBB,,, | Performed by: NURSE PRACTITIONER

## 2022-09-21 PROCEDURE — 3079F DIAST BP 80-89 MM HG: CPT | Mod: CPTII,,, | Performed by: NURSE PRACTITIONER

## 2022-09-21 PROCEDURE — 1160F PR REVIEW ALL MEDS BY PRESCRIBER/CLIN PHARMACIST DOCUMENTED: ICD-10-PCS | Mod: CPTII,,, | Performed by: NURSE PRACTITIONER

## 2022-09-21 PROCEDURE — 1159F PR MEDICATION LIST DOCUMENTED IN MEDICAL RECORD: ICD-10-PCS | Mod: CPTII,,, | Performed by: NURSE PRACTITIONER

## 2022-09-21 PROCEDURE — 4010F PR ACE/ARB THEARPY RXD/TAKEN: ICD-10-PCS | Mod: CPTII,,, | Performed by: NURSE PRACTITIONER

## 2022-09-21 PROCEDURE — 1101F PR PT FALLS ASSESS DOC 0-1 FALLS W/OUT INJ PAST YR: ICD-10-PCS | Mod: CPTII,,, | Performed by: NURSE PRACTITIONER

## 2022-09-21 PROCEDURE — 3079F PR MOST RECENT DIASTOLIC BLOOD PRESSURE 80-89 MM HG: ICD-10-PCS | Mod: CPTII,,, | Performed by: NURSE PRACTITIONER

## 2022-09-21 PROCEDURE — 1160F RVW MEDS BY RX/DR IN RCRD: CPT | Mod: CPTII,,, | Performed by: NURSE PRACTITIONER

## 2022-09-21 PROCEDURE — 3075F PR MOST RECENT SYSTOLIC BLOOD PRESS GE 130-139MM HG: ICD-10-PCS | Mod: CPTII,,, | Performed by: NURSE PRACTITIONER

## 2022-09-21 PROCEDURE — 3008F BODY MASS INDEX DOCD: CPT | Mod: CPTII,,, | Performed by: NURSE PRACTITIONER

## 2022-09-21 PROCEDURE — 1159F MED LIST DOCD IN RCRD: CPT | Mod: CPTII,,, | Performed by: NURSE PRACTITIONER

## 2022-09-21 PROCEDURE — 3075F SYST BP GE 130 - 139MM HG: CPT | Mod: CPTII,,, | Performed by: NURSE PRACTITIONER

## 2022-09-21 PROCEDURE — 99213 PR OFFICE/OUTPT VISIT, EST, LEVL III, 20-29 MIN: ICD-10-PCS | Mod: S$PBB,,, | Performed by: NURSE PRACTITIONER

## 2022-09-21 RX ORDER — MONTELUKAST SODIUM 10 MG/1
TABLET ORAL
COMMUNITY
Start: 2022-06-01

## 2022-09-21 RX ORDER — ERGOCALCIFEROL 1.25 MG/1
CAPSULE ORAL
Qty: 12 CAPSULE | Refills: 1 | Status: SHIPPED | OUTPATIENT
Start: 2022-09-21

## 2022-09-21 RX ORDER — DULOXETIN HYDROCHLORIDE 20 MG/1
CAPSULE, DELAYED RELEASE ORAL
Qty: 30 CAPSULE | Refills: 3 | Status: SHIPPED | OUTPATIENT
Start: 2022-09-21 | End: 2022-11-18

## 2022-09-21 NOTE — PROGRESS NOTES
Subjective:       Patient ID: James Martinez is a 71 y.o. male.    Chief Complaint:  No chief complaint on file.      History of Present Illness  This pleasant 71 year old right handed  male presents to clinic for follow up of dizziness. Patient states he is doing well today.  Patient states dizziness started about 5 years ago and is doing better. He states the dizziness is constant to some degree and worse in the mornings. He denies diaphoresis, nausea, vomiting, and denies any chest pains or palpitations. He does report blurred vision and fatigue. He has hypertension and is on two blood pressure medications. He is followed by Cardiologist Dr. Saxena and recent work up includes EKG, Echocardiogram and 24 hour holter monitor.  He has not followed up with his eye doctor in over two years per the patient and states he has an appointment scheduled with his ophthalmologist. He was encouraged to keep this appointment.  He denies any type of aura with the dizziness. He does report head movement and changing positions from laying to sitting or sitting to standing will make the dizziness worse. He states he was seen by Dr. Molina and vertigo was ruled out per the patient. He denies any stroke symptoms and denies any falls. He denies any tinnitus. He has been evaluated by ENT Dr. De La O in the past.  He does report bilateral lower extremity weakness and bilateral foot drag that has improved with physical therapy. He also reports numbness, burning and tingling in the bilateral and upper extremities that he rates as a 3 on a scale of 0 to 5. We did the MRI of the lumbar spine that was unrevealing. He went for an EMG NCS of all 4 extremities but this could not be done due to leg swelling and was instructed to reschedule when the swelling went down. He does not have any lower extremity edema today and will be rescheduled for the EMG NCS. He completed the physical therapy and states this did help and desires to have  more physical therapy. He will be referred to physical therapy for gait, balance and dizziness. Discussed fall precautions in detail.  He will continue his vitamin D 50,000 IU one capsule monthly. He has been off the gabapentin for 4 months and desires not to restart this. Discussed adding Cymbalta 20 mg one at bedtime and he is agreeable. Discussed the side effects of this medication. PMH includes HTN, dizziness, BPV, UTI, and allergies. FMH includes cancer, HTN, DM, and CVA. Discussed the plan in detail with Neurologist Dr. MARIO Cabrera and the patient and they are in agreement with the plan. All his questions were answered at today's visit.      MRI of the brain with and without contrast done on 2021 showed Senescent changes.  No other evidence of significant findings demonstrated.     EKG done on 2021 Sinus rhythm with occasional Premature ventricular complexes Left axis deviation Septal infarct age undetermined Abnormal ECG No previous ECGs available      Echocardiogram done on 2021 showed the estimated ejection fraction is 55%     Orthostatic BP check on 2021:  Layin/92   Sittin/98   Standin/84 , Orthostatic BP check on 2022: Layin/82  Sittin/86  Standin/90, orthostatic blood pressure check on 2022: Laying 142/82, Siting 146/80, Standing 136/80     CT of the head without contrast done on 2021 at [facility] showed No acute intracranial abnormality. No acute cervical spine fracture. Acute fracture of the right T1 transverse process and the right posterior second rib. Grade 1 anterolisthesis C5-C6, most likely on a degenerative basis.     MR of the cervical spine without contrast done on 2021 at [facility] showed No evidence of ligamentous injury in the cervical spine. Grade 1 anterolisthesis C5-C6, unchanged and likely related to degenerative disc disease.      MR of the thoracic spine without contrast  done on October 8, 2021 at [facility] showed 1. Acute fracture of the anterior-inferior endplate of T4 with unchanged minimal retrolisthesis. There is injury to the anterior longitudinal ligament as well as edema and involving the disc at this level. There is interspinous ligamentous edema as well as trace edema involving the ligamentum flavum at this level with mild fluid in the facet joints bilaterally at T4-T5. 2. No acute traumatic spinal cord injury or cord compression. 3. Two osseous lesions within the T12 and L1 vertebral bodies are noted likely representing atypical hemangiomas although other bone lesions not excluded. No aggressive findings are identified on the  CT.    MRI of the Lumbar spine with and without contrast done on June 23, 2022 showed Mild multilevel degenerative change as above. No abnormal post-contrast enhanced.          Review of Systems  Review of Systems   Constitutional:  Negative for activity change, diaphoresis, fever and unexpected weight change.   HENT:  Negative for congestion, ear pain, facial swelling, hearing loss, tinnitus, trouble swallowing and voice change.    Eyes:  Negative for photophobia, pain and visual disturbance.   Respiratory:  Negative for chest tightness, shortness of breath and wheezing.    Cardiovascular:  Negative for chest pain, palpitations and leg swelling.   Gastrointestinal:  Negative for constipation, diarrhea, nausea and vomiting.   Genitourinary:  Negative for difficulty urinating.   Musculoskeletal:  Positive for gait problem. Negative for back pain, neck pain and neck stiffness.   Skin:  Negative for color change, pallor, rash and wound.   Neurological:  Positive for dizziness, weakness and numbness. Negative for tremors, seizures, syncope, facial asymmetry, speech difficulty, light-headedness and headaches.   Psychiatric/Behavioral:  Negative for agitation, behavioral problems, confusion and hallucinations. The patient is not nervous/anxious and is  not hyperactive.      Objective:      Neurologic Exam     Mental Status   Oriented to person, place, and time.   Oriented to person.   Oriented to place.   Oriented to time.   Attention: normal. Concentration: normal.   Speech: speech is normal   Level of consciousness: alert  Knowledge: good.     Cranial Nerves     CN II   Visual fields full to confrontation.     CN III, IV, VI   Pupils are equal, round, and reactive to light.  Extraocular motions are normal.   Right pupil: Size: 3 mm. Shape: regular. Reactivity: brisk.   Left pupil: Size: 3 mm. Shape: regular. Reactivity: brisk.     CN V   Facial sensation intact.     CN VII   Facial expression full, symmetric.     CN VIII   CN VIII normal.   Hearing: intact    CN IX, X   CN IX normal.   CN X normal.     CN XI   CN XI normal.     CN XII   CN XII normal.     Motor Exam   Muscle bulk: normal  Overall muscle tone: normal  Right arm pronator drift: absent  Left arm pronator drift: absent    Strength   Right deltoid: 5/5  Left deltoid: 5/5  Right biceps: 5/5  Left biceps: 5/5  Right triceps: 5/5  Left triceps: 5/5  Right quadriceps: 5/5  Left quadriceps: 4/5  Right hamstrin/5  Left hamstrin/5    Sensory Exam   Light touch normal.     Gait, Coordination, and Reflexes     Gait  Gait: normal    Coordination   Romberg: positive  Finger to nose coordination: normal    Tremor   Resting tremor: absent  Intention tremor: absent  Action tremor: absent    Reflexes   Right brachioradialis: 2+  Left brachioradialis: 2+  Right biceps: 2+  Left biceps: 2+  Right triceps: 2+  Left triceps: 2+  Right patellar: 2+  Left patellar: 2+  Right achilles: 2+  Left achilles: 2+  Right : 4+  Left : 4+    Physical Exam  Constitutional:       General: He is not in acute distress.  HENT:      Head: Normocephalic.      Mouth/Throat:      Mouth: Mucous membranes are moist.   Eyes:      Extraocular Movements: Extraocular movements intact and EOM normal.      Pupils: Pupils are equal,  round, and reactive to light.   Cardiovascular:      Rate and Rhythm: Normal rate and regular rhythm.      Heart sounds: Normal heart sounds. No murmur heard.  Pulmonary:      Effort: Pulmonary effort is normal. No respiratory distress.      Breath sounds: Normal breath sounds. No wheezing, rhonchi or rales.   Musculoskeletal:         General: No swelling, tenderness, deformity or signs of injury. Normal range of motion.      Cervical back: Normal range of motion. No rigidity or tenderness.      Right lower leg: No edema.      Left lower leg: No edema.   Skin:     General: Skin is warm and dry.      Capillary Refill: Capillary refill takes less than 2 seconds.      Coloration: Skin is not jaundiced or pale.      Findings: No bruising, erythema, lesion or rash.   Neurological:      Mental Status: He is alert and oriented to person, place, and time.      Coordination: Romberg Test abnormal. Finger-Nose-Finger Test normal.      Gait: Gait is intact.      Deep Tendon Reflexes:      Reflex Scores:       Tricep reflexes are 2+ on the right side and 2+ on the left side.       Bicep reflexes are 2+ on the right side and 2+ on the left side.       Brachioradialis reflexes are 2+ on the right side and 2+ on the left side.       Patellar reflexes are 2+ on the right side and 2+ on the left side.       Achilles reflexes are 2+ on the right side and 2+ on the left side.  Psychiatric:         Mood and Affect: Mood normal.         Speech: Speech normal.         Behavior: Behavior normal.         Assessment:     Problem List Items Addressed This Visit          Endocrine    Vitamin D deficiency    Relevant Medications    ergocalciferol (ERGOCALCIFEROL) 50,000 unit Cap       Orthopedic    Weakness of both lower extremities - Primary    Relevant Orders    Ambulatory referral/consult to Neurology    Ambulatory referral/consult to Physical/Occupational Therapy       Other    Dizziness (Chronic)    Paresthesia    Relevant Medications     DULoxetine (CYMBALTA) 20 MG capsule    Other Relevant Orders    Ambulatory referral/consult to Neurology    Balance problems    Relevant Orders    Ambulatory referral/consult to Neurology    Ambulatory referral/consult to Physical/Occupational Therapy         Plan:     1. Referral for EMG NCS all 4 extremities at [facility] Dr. LISE Brush  2. Referral to physical therapy for gait, balance, and dizziness  3. Follow up appointment with eye doctor for blurred vision  4. Keep follow up with cardiologist Dr. Saxena  5. Keep follow up with ENT Dr. De La O  6. Regular follow up with PCP for medical management and blood pressure  7. Keep blood pressure under control  8. Orthostatic BP check  9. Renew and continue vitamin d 50,000 IU one capsule monthly  10. Rx Cymbalta 20 mg one capsule at bedtime, discussed side effects  11. Follow up with neurology in 3 months or sooner if needed

## 2022-09-21 NOTE — PATIENT INSTRUCTIONS
1. Referral for EMG NCS all 4 extremities at [facility] Dr. LISE Brush  2. Referral to physical therapy for gait, balance, and dizziness  3. Follow up appointment with eye doctor for blurred vision  4. Keep follow up with cardiologist Dr. Saxena  5. Keep follow up with ENT Dr. De La O  6. Regular follow up with PCP for medical management and blood pressure  7. Keep blood pressure under control  8. Orthostatic BP check  9. Renew and continue vitamin d 50,000 IU one capsule monthly  10. Rx Cymbalta 20 mg one capsule at bedtime, discussed side effects  11. Follow up with neurology in 3 months or sooner if needed

## 2022-09-28 ENCOUNTER — CLINICAL SUPPORT (OUTPATIENT)
Dept: REHABILITATION | Facility: HOSPITAL | Age: 72
End: 2022-09-28
Payer: COMMERCIAL

## 2022-09-28 ENCOUNTER — DOCUMENTATION ONLY (OUTPATIENT)
Dept: REHABILITATION | Facility: HOSPITAL | Age: 72
End: 2022-09-28
Payer: COMMERCIAL

## 2022-09-28 DIAGNOSIS — R26.89 BALANCE PROBLEMS: Primary | ICD-10-CM

## 2022-09-28 NOTE — PLAN OF CARE
Patient arrived for physical therapy evaluation today but just spoke with Physical Therapist regarding his concerns and was not evaluated. Patient reports continuing to have dizziness despite being cleared by myself and Dr. Molina (ENT) for benign paroxysmal positional vertigo. Patient has undergone 2 episodes of physical therapy over the past 2 years with the most recent being from June - August 2022 with an improvement in lower extremity strength and balance noted but no reduction in dizziness. Patient reports taking Amlodipine for ~4 years and Losartan for ~2 years, both of which list dizziness as a potential side effect. Patient reports his dizziness has been going on for ~3.5 years. Patient reports he plans to discuss his concerns with his primary care provider next week. There will be no charges billed for today's conversation/visit.

## 2022-10-27 ENCOUNTER — OFFICE VISIT (OUTPATIENT)
Dept: OTOLARYNGOLOGY | Facility: CLINIC | Age: 72
End: 2022-10-27
Payer: COMMERCIAL

## 2022-10-27 VITALS — BODY MASS INDEX: 29.39 KG/M2 | HEIGHT: 74 IN | WEIGHT: 229 LBS

## 2022-10-27 DIAGNOSIS — R42 VERTIGO: Primary | ICD-10-CM

## 2022-10-27 DIAGNOSIS — H90.3 SENSORINEURAL HEARING LOSS (SNHL) OF BOTH EARS: ICD-10-CM

## 2022-10-27 PROCEDURE — 4010F PR ACE/ARB THEARPY RXD/TAKEN: ICD-10-PCS | Mod: CPTII,,, | Performed by: OTOLARYNGOLOGY

## 2022-10-27 PROCEDURE — 99213 OFFICE O/P EST LOW 20 MIN: CPT | Mod: PBBFAC | Performed by: OTOLARYNGOLOGY

## 2022-10-27 PROCEDURE — 4010F ACE/ARB THERAPY RXD/TAKEN: CPT | Mod: CPTII,,, | Performed by: OTOLARYNGOLOGY

## 2022-10-27 PROCEDURE — 1159F MED LIST DOCD IN RCRD: CPT | Mod: CPTII,,, | Performed by: OTOLARYNGOLOGY

## 2022-10-27 PROCEDURE — 99214 OFFICE O/P EST MOD 30 MIN: CPT | Mod: S$PBB,,, | Performed by: OTOLARYNGOLOGY

## 2022-10-27 PROCEDURE — 99214 PR OFFICE/OUTPT VISIT, EST, LEVL IV, 30-39 MIN: ICD-10-PCS | Mod: S$PBB,,, | Performed by: OTOLARYNGOLOGY

## 2022-10-27 PROCEDURE — 1160F RVW MEDS BY RX/DR IN RCRD: CPT | Mod: CPTII,,, | Performed by: OTOLARYNGOLOGY

## 2022-10-27 PROCEDURE — 1160F PR REVIEW ALL MEDS BY PRESCRIBER/CLIN PHARMACIST DOCUMENTED: ICD-10-PCS | Mod: CPTII,,, | Performed by: OTOLARYNGOLOGY

## 2022-10-27 PROCEDURE — 1159F PR MEDICATION LIST DOCUMENTED IN MEDICAL RECORD: ICD-10-PCS | Mod: CPTII,,, | Performed by: OTOLARYNGOLOGY

## 2022-10-27 RX ORDER — MECLIZINE HYDROCHLORIDE 25 MG/1
25 TABLET ORAL 3 TIMES DAILY PRN
Qty: 90 TABLET | Refills: 2 | Status: SHIPPED | OUTPATIENT
Start: 2022-10-27 | End: 2023-06-13

## 2022-10-27 NOTE — PROGRESS NOTES
Subjective:       Patient ID: James Martinez is a 72 y.o. male.    Chief Complaint: Dizziness (Pt states dizziness continues, some days are worse than others. )    Dizziness:    Associated symptoms: light-headedness.  Review of Systems   Neurological:  Positive for dizziness and light-headedness.   All other systems reviewed and are negative.    Objective:      Physical Exam  General: NAD  Head: Normocephalic, atraumatic, no facial asymmetry/normal strength,  Ears: Both auricules normal in appearance, w/o deformities tympanic membranes normal external auditory canals normal  Nose: External nose w/o deformities normal turbinates no drainage or inflammation  Oral Cavity: Lips, gums, floor of mouth, tongue hard palate, and buccal mucosa without mass/lesion  Oropharynx: Mucosa pink and moist, soft palate, posterior pharynx and oropharyngeal wall without mass/lesion  Neck: Supple, symmetric, trachea midline, no palpable mass/lesion, no palpable cervical lymphadenopathy  Skin: Warm and dry, no concerning lesions  Respiratory: Respirations even, unlabored   Assessment:       1. Vertigo    2. Sensorineural hearing loss (SNHL) of both ears        Plan:       Refill Meclizine

## 2023-06-13 ENCOUNTER — OFFICE VISIT (OUTPATIENT)
Dept: NEUROLOGY | Facility: CLINIC | Age: 73
End: 2023-06-13
Payer: COMMERCIAL

## 2023-06-13 VITALS
HEART RATE: 56 BPM | HEIGHT: 75 IN | WEIGHT: 227 LBS | OXYGEN SATURATION: 98 % | SYSTOLIC BLOOD PRESSURE: 122 MMHG | DIASTOLIC BLOOD PRESSURE: 70 MMHG | BODY MASS INDEX: 28.23 KG/M2

## 2023-06-13 DIAGNOSIS — R29.818 PARKINSONIAN FEATURES: ICD-10-CM

## 2023-06-13 DIAGNOSIS — R42 DIZZINESS: Primary | ICD-10-CM

## 2023-06-13 PROCEDURE — 3078F DIAST BP <80 MM HG: CPT | Mod: CPTII,,, | Performed by: NURSE PRACTITIONER

## 2023-06-13 PROCEDURE — 1159F PR MEDICATION LIST DOCUMENTED IN MEDICAL RECORD: ICD-10-PCS | Mod: CPTII,,, | Performed by: NURSE PRACTITIONER

## 2023-06-13 PROCEDURE — 3074F PR MOST RECENT SYSTOLIC BLOOD PRESSURE < 130 MM HG: ICD-10-PCS | Mod: CPTII,,, | Performed by: NURSE PRACTITIONER

## 2023-06-13 PROCEDURE — 3288F PR FALLS RISK ASSESSMENT DOCUMENTED: ICD-10-PCS | Mod: CPTII,,, | Performed by: NURSE PRACTITIONER

## 2023-06-13 PROCEDURE — 3008F BODY MASS INDEX DOCD: CPT | Mod: CPTII,,, | Performed by: NURSE PRACTITIONER

## 2023-06-13 PROCEDURE — 99213 OFFICE O/P EST LOW 20 MIN: CPT | Mod: S$PBB,,, | Performed by: NURSE PRACTITIONER

## 2023-06-13 PROCEDURE — 1126F AMNT PAIN NOTED NONE PRSNT: CPT | Mod: CPTII,,, | Performed by: NURSE PRACTITIONER

## 2023-06-13 PROCEDURE — 1159F MED LIST DOCD IN RCRD: CPT | Mod: CPTII,,, | Performed by: NURSE PRACTITIONER

## 2023-06-13 PROCEDURE — 1126F PR PAIN SEVERITY QUANTIFIED, NO PAIN PRESENT: ICD-10-PCS | Mod: CPTII,,, | Performed by: NURSE PRACTITIONER

## 2023-06-13 PROCEDURE — 4010F ACE/ARB THERAPY RXD/TAKEN: CPT | Mod: CPTII,,, | Performed by: NURSE PRACTITIONER

## 2023-06-13 PROCEDURE — 3078F PR MOST RECENT DIASTOLIC BLOOD PRESSURE < 80 MM HG: ICD-10-PCS | Mod: CPTII,,, | Performed by: NURSE PRACTITIONER

## 2023-06-13 PROCEDURE — 3074F SYST BP LT 130 MM HG: CPT | Mod: CPTII,,, | Performed by: NURSE PRACTITIONER

## 2023-06-13 PROCEDURE — 99213 PR OFFICE/OUTPT VISIT, EST, LEVL III, 20-29 MIN: ICD-10-PCS | Mod: S$PBB,,, | Performed by: NURSE PRACTITIONER

## 2023-06-13 PROCEDURE — 3008F PR BODY MASS INDEX (BMI) DOCUMENTED: ICD-10-PCS | Mod: CPTII,,, | Performed by: NURSE PRACTITIONER

## 2023-06-13 PROCEDURE — 1101F PR PT FALLS ASSESS DOC 0-1 FALLS W/OUT INJ PAST YR: ICD-10-PCS | Mod: CPTII,,, | Performed by: NURSE PRACTITIONER

## 2023-06-13 PROCEDURE — 1101F PT FALLS ASSESS-DOCD LE1/YR: CPT | Mod: CPTII,,, | Performed by: NURSE PRACTITIONER

## 2023-06-13 PROCEDURE — 3288F FALL RISK ASSESSMENT DOCD: CPT | Mod: CPTII,,, | Performed by: NURSE PRACTITIONER

## 2023-06-13 PROCEDURE — 1160F RVW MEDS BY RX/DR IN RCRD: CPT | Mod: CPTII,,, | Performed by: NURSE PRACTITIONER

## 2023-06-13 PROCEDURE — 99215 OFFICE O/P EST HI 40 MIN: CPT | Mod: PBBFAC | Performed by: NURSE PRACTITIONER

## 2023-06-13 PROCEDURE — 4010F PR ACE/ARB THEARPY RXD/TAKEN: ICD-10-PCS | Mod: CPTII,,, | Performed by: NURSE PRACTITIONER

## 2023-06-13 PROCEDURE — 1160F PR REVIEW ALL MEDS BY PRESCRIBER/CLIN PHARMACIST DOCUMENTED: ICD-10-PCS | Mod: CPTII,,, | Performed by: NURSE PRACTITIONER

## 2023-06-13 RX ORDER — MECLIZINE HYDROCHLORIDE 25 MG/1
25 TABLET ORAL 3 TIMES DAILY PRN
Qty: 90 TABLET | Refills: 1 | Status: SHIPPED | OUTPATIENT
Start: 2023-06-13 | End: 2024-01-17

## 2023-06-13 NOTE — PATIENT INSTRUCTIONS
Refer patient for TIGIST scan  Continue current medications and plan of care  Physical therapy for gait difficulty

## 2023-06-13 NOTE — PROGRESS NOTES
Subjective:       Patient ID: James Martinez is a 72 y.o. male     Chief Complaint:    Chief Complaint   Patient presents with    Follow-up        Allergies:  Pcn [penicillins], Penicillamine, and Weed pollen-giant (tall) ragweed    Current Medications:    Outpatient Encounter Medications as of 6/13/2023   Medication Sig Dispense Refill    amLODIPine (NORVASC) 5 MG tablet Take 5 mg by mouth once daily.      aspirin 81 MG Chew Take 81 mg by mouth once daily.      cetirizine (ZYRTEC) 10 MG tablet       ergocalciferol (ERGOCALCIFEROL) 50,000 unit Cap Take one capsule monthly 12 capsule 1    fluticasone propionate (FLONASE) 50 mcg/actuation nasal spray INHALE 1 TO 2 PUFFS IN EACH NOSTRIL AT BEDTIME      losartan (COZAAR) 50 MG tablet Take 50 mg by mouth once daily.      montelukast (SINGULAIR) 10 mg tablet       [DISCONTINUED] DULoxetine (CYMBALTA) 20 MG capsule TAKE 1 CAPSULE EVERY DAY AT BEDTIME 60 capsule 0    [DISCONTINUED] gabapentin (NEURONTIN) 300 MG capsule Take 300 mg by mouth 3 (three) times daily.      diazePAM (VALIUM) 5 MG tablet Take 1 tablet (5 mg total) by mouth every 6 (six) hours as needed for Anxiety (1 tab 30 minutes prior to MRI and 1 tab at time of MRI). 2 tablet 0    meclizine (ANTIVERT) 25 mg tablet Take 1 tablet (25 mg total) by mouth 3 (three) times daily as needed for Dizziness. 90 tablet 1    [DISCONTINUED] meclizine (ANTIVERT) 25 mg tablet TAKE 1 TABLET BY MOUTH 3 TIMES A DAY AS NEEDED FOR DIZZINESS (Patient not taking: Reported on 6/8/2022) 90 tablet 0    [DISCONTINUED] meclizine (ANTIVERT) 25 mg tablet Take 1 tablet (25 mg total) by mouth 3 (three) times daily as needed for Dizziness. (Patient not taking: Reported on 6/13/2023) 90 tablet 2     No facility-administered encounter medications on file as of 6/13/2023.       History of Present Illness  73 y/o male following in neurology for reported dizziness    Prior followed by IZABELLA Jimenez, but last clinic visit was 9 months  ago.    Apparently onset of dizziness was 2018.  He has had extensive workup by both neurology and cardiology.  Actually seen by Dr. Molina at [facility] previously for 2nd opinion and reported they ruled out vertigo.      Prior also reported weakness in BLE with burning and stinging.  And was referred prior for EMG but at that time had too much leg swelling, but at his last visit 9 months swelling was resolved and the EMG was again ordered.  However, I do not see where it was done. Prior PT did help with weakness per the EMR.  Apparently prior on neurontin but was off last visit and did not wish to restart.  They started him with cymbalta 20mg daily.  He denies benefit.    Prior MRI brain 6/4/21 no acute findings.  MRI cervical spine 10/8/21 at [facility] no reported significant findings.  MRI T spine 10/8/21 at [facility] reported fracture at T4 with noted hemangiomas.  MRI lumbar spine 6/23/22 multilevel degenerative changes.    On exam I note patient has mask like appearance, noted on ambulation patient has stiffness in arms and shuffling in his gait, mild bradykinesia.  He reports sometimes with resting tremor.  Denies family history.  I discussed with him about parkinsonian symptoms, given his gait difficulty id like to get TIGIST scan.  He is in agreement.         Review of Systems  Review of Systems   Constitutional:  Negative for diaphoresis and fever.   HENT:  Negative for congestion, hearing loss and tinnitus.    Eyes:  Negative for blurred vision, double vision, photophobia, discharge and redness.   Respiratory:  Negative for cough and shortness of breath.    Cardiovascular:  Negative for chest pain.   Gastrointestinal:  Negative for abdominal pain, nausea and vomiting.   Musculoskeletal:  Positive for falls. Negative for back pain, joint pain, myalgias and neck pain.   Skin:  Negative for itching and rash.   Neurological:  Positive for tremors. Negative for dizziness, sensory change, speech change, focal weakness, seizures,  loss of consciousness, weakness and headaches.   Psychiatric/Behavioral:  Negative for depression, hallucinations and memory loss. The patient does not have insomnia.    All other systems reviewed and are negative.   Objective:     NEUROLOGICAL EXAMINATION:     MENTAL STATUS   Oriented to person, place, and time.   Registration: recalls 3 of 3 objects. Recall at 5 minutes: recalls 3 of 3 objects.   Attention: normal. Concentration: normal.   Speech: speech is normal   Level of consciousness: alert  Knowledge: good and consistent with education.   Normal comprehension.     CRANIAL NERVES     CN II   Visual fields full to confrontation.   Visual acuity: normal  Right visual field deficit: none  Left visual field deficit: none     CN III, IV, VI   Pupils are equal, round, and reactive to light.  Extraocular motions are normal.   Right pupil: Size: 3 mm. Shape: regular. Reactivity: brisk. Consensual response: intact. Accommodation: intact.   Left pupil: Size: 3 mm. Shape: regular. Reactivity: brisk. Consensual response: intact. Accommodation: intact.   CN III: no CN III palsy  CN VI: no CN VI palsy  Nystagmus: none   Diplopia: none  Upgaze: normal  Downgaze: normal  Conjugate gaze: present  Vestibulo-ocular reflex: present    CN V   Facial sensation intact.   Right facial sensation deficit: none  Left facial sensation deficit: none  Right corneal reflex: normal  Left corneal reflex: normal    CN VII   Facial expression full, symmetric.   Right facial weakness: none  Left facial weakness: none  Right taste: normal  Left taste: normal    CN VIII   CN VIII normal.   Hearing: intact    CN IX, X   CN IX normal.   CN X normal.   Palate: symmetric    CN XI   CN XI normal.   Right sternocleidomastoid strength: normal  Left sternocleidomastoid strength: normal  Right trapezius strength: normal  Left trapezius strength: normal    CN XII   CN XII normal.   Tongue: not atrophic  Fasciculations: absent  Tongue deviation:  none    MOTOR EXAM   Muscle bulk: normal  Overall muscle tone: normal  Right arm tone: normal  Left arm tone: normal  Right arm pronator drift: absent  Left arm pronator drift: absent  Right leg tone: normal  Left leg tone: normal    Strength   Right neck flexion: 5/5  Left neck flexion: 55  Right neck extension: /5  Left neck extension:   Right deltoid: /  Left deltoid: /  Right biceps: 5  Left biceps: 5  Right triceps:   Left triceps:   Right wrist flexion:   Left wrist flexion: /  Right wrist extension:   Left wrist extension:   Right interossei:   Left interossei:   Right iliopsoas:   Left iliopsoas:   Right quadriceps:   Left quadriceps:   Right hamstrin/5  Left hamstrin/5  Right anterior tibial:   Left anterior tibial:   Right posterior tibial:   Left posterior tibial:   Right gastroc:   Left gastroc:     REFLEXES     Reflexes   Right brachioradialis: 2+  Left brachioradialis: 2+  Right biceps: 2+  Left biceps: 2+  Right triceps: 2+  Left triceps: 2+  Right patellar: 2+  Left patellar: 2+  Right achilles: 2+  Left achilles: 2+  Right plantar: normal  Left plantar: normal  Right Jefferson: absent  Left Jefferson: absent  Right ankle clonus: absent  Left ankle clonus: absent  Right pendular knee jerk: absent  Left pendular knee jerk: absent    SENSORY EXAM   Light touch normal.   Right arm light touch: normal  Left arm light touch: normal  Right leg light touch: normal  Left leg light touch: normal  Vibration normal.   Right arm vibration: normal  Left arm vibration: normal  Right leg vibration: normal  Left leg vibration: normal  Proprioception normal.   Right arm proprioception: normal  Left arm proprioception: normal  Right leg proprioception: normal  Left leg proprioception: normal  Pinprick normal.   Right arm pinprick: normal  Left arm pinprick: normal  Right leg pinprick: normal  Left leg pinprick: normal  Graphesthesia:  normal  Romberg: negative  Stereognosis: normal    GAIT AND COORDINATION     Gait  Gait: shuffling     Coordination   Finger to nose coordination: normal  Heel to shin coordination: normal  Tandem walking coordination: abnormal    Tremor   Resting tremor: present  Intention tremor: absent  Action tremor: absent     Physical Exam  Vitals and nursing note reviewed.   Constitutional:       Appearance: Normal appearance.   HENT:      Head: Normocephalic.   Eyes:      Extraocular Movements: EOM normal.      Pupils: Pupils are equal, round, and reactive to light.   Cardiovascular:      Rate and Rhythm: Normal rate and regular rhythm.      Pulses: Normal pulses.      Heart sounds: Normal heart sounds.   Pulmonary:      Effort: Pulmonary effort is normal.      Breath sounds: Normal breath sounds.   Musculoskeletal:         General: Normal range of motion.      Cervical back: Normal range of motion and neck supple.   Skin:     General: Skin is warm and dry.   Neurological:      General: No focal deficit present.      Mental Status: He is alert and oriented to person, place, and time.      Cranial Nerves: No cranial nerve deficit.      Sensory: No sensory deficit.      Motor: No weakness.      Coordination: Coordination normal. Finger-Nose-Finger Test, Heel to Shin Test and Romberg Test normal.      Gait: Gait abnormal and tandem walk abnormal.      Deep Tendon Reflexes: Reflexes normal.      Reflex Scores:       Tricep reflexes are 2+ on the right side and 2+ on the left side.       Bicep reflexes are 2+ on the right side and 2+ on the left side.       Brachioradialis reflexes are 2+ on the right side and 2+ on the left side.       Patellar reflexes are 2+ on the right side and 2+ on the left side.       Achilles reflexes are 2+ on the right side and 2+ on the left side.  Psychiatric:         Mood and Affect: Mood normal.         Speech: Speech normal.         Behavior: Behavior normal.        Assessment:     Problem List  Items Addressed This Visit          Neuro    Parkinsonian features    Relevant Orders    NM Brain 3D Imaging Spect       Other    Dizziness - Primary (Chronic)    Relevant Medications    meclizine (ANTIVERT) 25 mg tablet    Other Relevant Orders    Ambulatory referral/consult to Physical/Occupational Therapy        Primary Diagnosis and ICD10  Dizziness [R42]    Plan:     Patient Instructions   Refer patient for TIGIST scan  Continue current medications and plan of care  Physical therapy for gait difficulty    Medications Discontinued During This Encounter   Medication Reason    DULoxetine (CYMBALTA) 20 MG capsule     gabapentin (NEURONTIN) 300 MG capsule     meclizine (ANTIVERT) 25 mg tablet     meclizine (ANTIVERT) 25 mg tablet        Requested Prescriptions     Signed Prescriptions Disp Refills    meclizine (ANTIVERT) 25 mg tablet 90 tablet 1     Sig: Take 1 tablet (25 mg total) by mouth 3 (three) times daily as needed for Dizziness.       Orders Placed This Encounter   Procedures    NM Brain 3D Imaging Spect    Ambulatory referral/consult to Physical/Occupational Therapy

## 2023-06-26 ENCOUNTER — CLINICAL SUPPORT (OUTPATIENT)
Dept: REHABILITATION | Facility: HOSPITAL | Age: 73
End: 2023-06-26
Payer: COMMERCIAL

## 2023-06-26 DIAGNOSIS — R42 DIZZINESS: ICD-10-CM

## 2023-06-26 DIAGNOSIS — R29.898 WEAKNESS OF BOTH LOWER EXTREMITIES: Primary | ICD-10-CM

## 2023-06-26 DIAGNOSIS — R26.89 BALANCE PROBLEMS: ICD-10-CM

## 2023-06-26 PROCEDURE — 97162 PT EVAL MOD COMPLEX 30 MIN: CPT
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
## 2023-08-03 ENCOUNTER — CLINICAL SUPPORT (OUTPATIENT)
Dept: REHABILITATION | Facility: HOSPITAL | Age: 73
End: 2023-08-03
Payer: COMMERCIAL

## 2023-08-03 DIAGNOSIS — R42 DIZZINESS: Chronic | ICD-10-CM

## 2023-08-03 DIAGNOSIS — R26.89 BALANCE PROBLEMS: ICD-10-CM

## 2023-08-03 DIAGNOSIS — R29.898 WEAKNESS OF BOTH LOWER EXTREMITIES: Primary | ICD-10-CM

## 2023-08-03 PROCEDURE — 97116 GAIT TRAINING THERAPY: CPT

## 2023-08-03 PROCEDURE — 97110 THERAPEUTIC EXERCISES: CPT

## 2023-08-03 NOTE — PROGRESS NOTES
[facility]   Physical Therapy Treatment Note      Name: James Martinez  Clinic Number: [id number]    Therapy Diagnosis:   Encounter Diagnoses   Name Primary?    Weakness of both lower extremities Yes    Dizziness     Balance problems      Physician: Kishore Eugene FNP    Visit Date: 8/3/2023  Physician Orders: PT Eval and Treat   Medical Diagnosis from Referral: dizziness  Evaluation Date: 2023  Authorization Period Expiration: 2023  Plan of Care Expiration: 2023  Progress Note Due: 2023  Visit # / Visits authorized:   FOTO: 50/56      PTA Visit #: 0/5       Subjective     Pt reports: difficulty walking.  He has not been given HEP yet  Response to previous treatment: no c/o  Functional change: no change since evaluation    Pain: 0/10  Location: na      Objective      Objective Measures updated at progress report unless specified.     Treatment     Reg received the treatments listed below:      therapeutic exercises to develop strength, endurance, ROM, flexibility, posture, and core stabilization   50 Minutes Performed today Repetitions   cubii x 6 min   (B) Heel cord stretch with strap x 5 x 30 sh   (B) hamstring stretch with strap x 5x30sh   (B) seated hip flexion x Blue x 30   (B) seated hip abd x Blue x 30   (B) seated hip add x Blue x 30   (B seated knee flexion x Blue x 30                     manual therapy techniques: Joint mobilizations, Manual traction, Myofacial release, and Soft tissue Mobilization were applied to the: 0 for 0 minutes, includin    neuromuscular re-education activities to improve: Balance, Coordination, Proprioception, and Posture for 0 minutes. The following activities were included:  0    therapeutic activities to improve functional performance for 0  minutes, includin    gait training to improve functional mobility and safety for 5  minutes, including:  Gait /s AD, flexed posture, diminished heel/toe pattern and foot  clearance, decreased stride length.    Patient Education and Home Exercises       Education provided:   - proper form for exercise  -gait training    Written Home Exercises Provided:  not yet provided    Assessment     Patient has tremors over entire body, shuffled Parkinson's type gait, small movements of hips and knees.     Reg Is progressing slowly towards his goals.   Pt prognosis is Fair<>Good.     Pt will continue to benefit from skilled outpatient physical therapy to address the deficits listed in the problem list box on initial evaluation, provide pt/family education and to maximize pt's level of independence in the home and community environment.     Pt's spiritual, cultural and educational needs considered and pt agreeable to plan of care and goals.     Anticipated barriers to physical therapy: parkinson's type movements    Goals:     Short Term Goals: 6 weeks   I with HEP  Improve right shld flexion 10% right shld er 10%  Improve right cervical rotation 15%     Long Term Goals: 12 weeks   Improve right cervical rotation 30%  Decrease pain worst 10/10 to 5/10 with activity.  Improve tinetti score from 21/28 to 25/28.  Generate a discharge FOTO score  Plan     Plan of care Certification: 6/26/2023 to 9/26/2023.     Outpatient Physical Therapy 2 times weekly for 12 weeks to include the following interventions: Cervical/Lumbar Traction, Electrical Stimulation 0-300 hz, Gait Training, Iontophoresis (with 2.0 ml dexamethasone), Manual Therapy, Moist Heat/ Ice, Neuromuscular Re-ed, Orthotic Management and Training, Patient Education, Therapeutic Activities, Therapeutic Exercise, and Ultrasound.     LUIS CROWELL, PT

## 2023-08-08 ENCOUNTER — CLINICAL SUPPORT (OUTPATIENT)
Dept: REHABILITATION | Facility: HOSPITAL | Age: 73
End: 2023-08-08
Payer: COMMERCIAL

## 2023-08-08 DIAGNOSIS — R26.89 BALANCE PROBLEMS: ICD-10-CM

## 2023-08-08 DIAGNOSIS — G20.C PARKINSONISM, UNSPECIFIED PARKINSONISM TYPE: ICD-10-CM

## 2023-08-08 DIAGNOSIS — R29.898 WEAKNESS OF BOTH LOWER EXTREMITIES: Primary | ICD-10-CM

## 2023-08-08 DIAGNOSIS — R42 DIZZINESS: Chronic | ICD-10-CM

## 2023-08-08 PROCEDURE — 97112 NEUROMUSCULAR REEDUCATION: CPT

## 2023-08-08 PROCEDURE — 97530 THERAPEUTIC ACTIVITIES: CPT

## 2023-08-08 PROCEDURE — 97110 THERAPEUTIC EXERCISES: CPT

## 2023-08-08 NOTE — PROGRESS NOTES
[facility]   Physical Therapy Treatment Note      Name: James Martinez  Clinic Number: [id number]    Therapy Diagnosis:   Encounter Diagnoses   Name Primary?    Weakness of both lower extremities Yes    Dizziness     Balance problems     Parkinsonism, unspecified Parkinsonism type      Physician: Kishore Eugene FNP    Visit Date: 8/8/2023  Physician Orders: PT Eval and Treat   Medical Diagnosis from Referral: dizziness  Evaluation Date: 6/26/2023  Authorization Period Expiration: 12/31/2023  Plan of Care Expiration: 9/27/2023  Progress Note Due: 9/27/2023  Visit # / Visits authorized: 2/11  FOTO: 50/56      PTA Visit #: 0/5       Subjective     Pt reports: difficulty walking.  He has not been given HEP yet  Response to previous treatment: no c/o  Functional change: no change since evaluation    Pain: 0/10  Location: na      Objective      Objective Measures updated at progress report unless specified.     Treatment     Reg received the treatments listed below:      therapeutic exercises to develop strength, endurance, ROM, flexibility, posture, and core stabilization   30 Minutes Performed today Repetitions   cubii x 6 min   (B) Heel cord stretch with strap x 5 x 30 sh   (B) hamstring stretch with strap x 5x30sh   (B) seated hip flexion  Blue x 30   (B) seated hip abd  Blue x 30   (B) seated hip add  Blue x 30   (B seated knee flexion  Blue x 30   Hook lying hip abd x Grey x 30              neuromuscular re-education activities to improve: Balance, Coordination, Proprioception, and Posture.    10 Minutes Performed today Repetitions   Seated flexion to floor x x10   bridges x x20                                                therapeutic activities to improve functional performance   Minutes Performed today Repetitions   Transfers stand<>floor x 15 min                                                 gait training to improve functional mobility and safety for 5  minutes, including:  Gait  /s AD, flexed posture, diminished heel/toe pattern and foot clearance, decreased stride length.    Minutes Performed today Repetitions                                                      manual therapy techniques: Joint mobilizations, Manual traction, Myofacial release, and Soft tissue Mobilization were applied to the: 0 for 0 minutes, includin    Patient Education and Home Exercises       Education provided:   - proper form for exercise  - neuromuscular re-education to overcome small Parkinson's type movements in functional mobility.    Written Home Exercises Provided:  not yet provided    Assessment     Patient has tremors over entire body, shuffled Parkinson's type gait, small movements of hips and knees.     Reg Is progressing slowly towards his goals.   Pt prognosis is Fair<>Good.     Pt will continue to benefit from skilled outpatient physical therapy to address the deficits listed in the problem list box on initial evaluation, provide pt/family education and to maximize pt's level of independence in the home and community environment.     Pt's spiritual, cultural and educational needs considered and pt agreeable to plan of care and goals.     Anticipated barriers to physical therapy: parkinson's type movements    Goals:     Short Term Goals: 6 weeks   I with HEP  Improve right shld flexion 10% right shld er 10%  Improve right cervical rotation 15%     Long Term Goals: 12 weeks   Improve right cervical rotation 30%  Decrease pain worst 10/10 to 5/10 with activity.  Improve tinetti score from 21/28 to 25/28.  Generate a discharge FOTO score of 56.  Plan     Plan of care Certification: 2023 to 2023.     Outpatient Physical Therapy 2 times weekly for 12 weeks to include the following interventions: Cervical/Lumbar Traction, Electrical Stimulation 0-300 hz, Gait Training, Iontophoresis (with 2.0 ml dexamethasone), Manual Therapy, Moist Heat/ Ice, Neuromuscular Re-ed, Orthotic Management and  Training, Patient Education, Therapeutic Activities, Therapeutic Exercise, and Ultrasound.     LUIS CROWELL, PT

## 2023-08-10 ENCOUNTER — CLINICAL SUPPORT (OUTPATIENT)
Dept: REHABILITATION | Facility: HOSPITAL | Age: 73
End: 2023-08-10
Payer: COMMERCIAL

## 2023-08-10 DIAGNOSIS — R29.898 WEAKNESS OF BOTH LOWER EXTREMITIES: Primary | ICD-10-CM

## 2023-08-10 DIAGNOSIS — R42 DIZZINESS: Chronic | ICD-10-CM

## 2023-08-10 DIAGNOSIS — R26.89 BALANCE PROBLEMS: ICD-10-CM

## 2023-08-10 PROCEDURE — 97112 NEUROMUSCULAR REEDUCATION: CPT

## 2023-08-10 PROCEDURE — 97116 GAIT TRAINING THERAPY: CPT

## 2023-08-10 NOTE — PROGRESS NOTES
[facility]   Physical Therapy Treatment Note      Name: James Martinez  Clinic Number: [id number]    Therapy Diagnosis:   Encounter Diagnoses   Name Primary?    Weakness of both lower extremities Yes    Dizziness     Balance problems      Physician: Kishore Eugene FNP    Visit Date: 8/10/2023  Physician Orders: PT Eval and Treat   Medical Diagnosis from Referral: dizziness  Evaluation Date: 6/26/2023  Authorization Period Expiration: 12/31/2023  Plan of Care Expiration: 9/27/2023  Progress Note Due: 9/27/2023  Visit # / Visits authorized: 2/11  FOTO: 50/56      PTA Visit #: 0/5       Subjective     Pt reports: difficulty walking.  He has not been given HEP yet  Response to previous treatment: no c/o  Functional change: no change since evaluation    Pain: 0/10  Location: na      Objective      Objective Measures updated at progress report unless specified.     Treatment     Reg received the treatments listed below:      therapeutic exercises to develop strength, endurance, ROM, flexibility, posture, and core stabilization    Minutes Performed today Repetitions   cubii  6 min   (B) Heel cord stretch with strap  5 x 30 sh   (B) hamstring stretch with strap  5x30sh   (B) seated hip flexion  Blue x 30   (B) seated hip abd  Blue x 30   (B) seated hip add  Blue x 30   (B seated knee flexion  Blue x 30   Hook lying hip abd  Grey x 30              neuromuscular re-education activities to improve: Balance, Coordination, Proprioception, and Posture.    45 Minutes Performed today Repetitions   Seated flexion to floor x 10x10 sh   bridges x x20   Seated rotation forward to left side then right x [id number]   Forward step and reach left, right x x10   Sideways step and reach left, right x x10   Backward step and reach left, right x x10   Forward rock and reach left, right x x10   Sideways rock and reach left, right x x10                  therapeutic activities to improve functional performance   Minutes  Performed today Repetitions   Transfers stand<>floor  15 min                                                 gait training to improve functional mobility and safety for 15  minutes, including:  Gait /s AD, flexed posture, diminished heel/toe pattern and foot clearance, decreased stride length.  Cues needed to increase arm swing left>right, increase stride length, increased trunk rotation.  He was able to change behavior with cues but unable to carry over once therapy was over and he was observed walking to his car.    Minutes Performed today Repetitions   Gait /s AD x 15 min                                                 manual therapy techniques: Joint mobilizations, Manual traction, Myofacial release, and Soft tissue Mobilization were applied to the: 0 for 0 minutes, includin    Patient Education and Home Exercises       Education provided:   - proper form for exercise  - neuromuscular re-education to overcome small Parkinson's type movements in functional mobility.    Written Home Exercises Provided:  not yet provided    Assessment     Patient has tremors over entire body, shuffled Parkinson's type gait, small movements of hips and knees. Patient is learning to make larger movements with gait.    Reg Is progressing slowly towards his goals.   Pt prognosis is Fair<>Good.     Pt will continue to benefit from skilled outpatient physical therapy to address the deficits listed in the problem list box on initial evaluation, provide pt/family education and to maximize pt's level of independence in the home and community environment.     Pt's spiritual, cultural and educational needs considered and pt agreeable to plan of care and goals.     Anticipated barriers to physical therapy: parkinson's type movements    Goals:     Short Term Goals: 6 weeks   I with HEP  Improve right shld flexion 10% right shld er 10%  Improve right cervical rotation 15%     Long Term Goals: 12 weeks   Improve right cervical rotation  30%  Decrease pain worst 10/10 to 5/10 with activity.  Improve tinetti score from 21/28 to 25/28.  Generate a discharge FOTO score of 56.  Plan     Plan of care Certification: 6/26/2023 to 9/26/2023.     Outpatient Physical Therapy 2 times weekly for 12 weeks to include the following interventions: Cervical/Lumbar Traction, Electrical Stimulation 0-300 hz, Gait Training, Iontophoresis (with 2.0 ml dexamethasone), Manual Therapy, Moist Heat/ Ice, Neuromuscular Re-ed, Orthotic Management and Training, Patient Education, Therapeutic Activities, Therapeutic Exercise, and Ultrasound.     LUIS CROWELL, PT

## 2023-08-15 ENCOUNTER — CLINICAL SUPPORT (OUTPATIENT)
Dept: REHABILITATION | Facility: HOSPITAL | Age: 73
End: 2023-08-15
Payer: COMMERCIAL

## 2023-08-15 DIAGNOSIS — R26.89 BALANCE PROBLEMS: ICD-10-CM

## 2023-08-15 DIAGNOSIS — R29.898 WEAKNESS OF BOTH LOWER EXTREMITIES: Primary | ICD-10-CM

## 2023-08-15 DIAGNOSIS — R42 DIZZINESS: Chronic | ICD-10-CM

## 2023-08-15 PROCEDURE — 97116 GAIT TRAINING THERAPY: CPT

## 2023-08-15 PROCEDURE — 97112 NEUROMUSCULAR REEDUCATION: CPT

## 2023-08-15 NOTE — PROGRESS NOTES
[facility]   Physical Therapy Treatment Note      Name: James Martinez  Clinic Number: [id number]    Therapy Diagnosis:   Encounter Diagnoses   Name Primary?    Weakness of both lower extremities Yes    Dizziness     Balance problems      Physician: Kishore Eugene FNP    Visit Date: 8/15/2023  Physician Orders: PT Eval and Treat   Medical Diagnosis from Referral: dizziness  Evaluation Date: 6/26/2023  Authorization Period Expiration: 12/31/2023  Plan of Care Expiration: 9/27/2023  Progress Note Due: 9/27/2023  Visit # / Visits authorized: 5/11  FOTO: 50/56      PTA Visit #: 0/5       Subjective     Pt reports: difficulty walking.  He has been given  Response to previous treatment: no c/o  Functional change: no change since evaluation    Pain: 0/10  Location: na      Objective      Objective Measures updated at progress report unless specified.     Treatment     Reg received the treatments listed below:      therapeutic exercises to develop strength, endurance, ROM, flexibility, posture, and core stabilization    Minutes Performed today Repetitions   cubii  6 min   (B) Heel cord stretch with strap  5 x 30 sh   (B) hamstring stretch with strap  5x30sh   (B) seated hip flexion  Blue x 30   (B) seated hip abd  Blue x 30   (B) seated hip add  Blue x 30   (B seated knee flexion  Blue x 30   Hook lying hip abd  Grey x 30              neuromuscular re-education activities to improve: Balance, Coordination, Proprioception, and Posture.    45 Minutes Performed today Repetitions   Seated flexion to floor x 10x10 sh   bridges x x20   Seated rotation forward to left side then right x [id number]   Forward step and reach left, right x x10   Sideways step and reach left, right x x10   Backward step and reach left, right x x10   Forward rock and reach left, right x x10   Sideways rock and reach left, right x x10                  therapeutic activities to improve functional performance   Minutes Performed  today Repetitions   Transfers stand<>floor  15 min                                                 gait training to improve functional mobility and safety for 15  minutes, including:  Gait /s AD, flexed posture, diminished heel/toe pattern and foot clearance, decreased stride length.  Cues needed to increase arm swing left>right, increase stride length, increased trunk rotation.  He was able to change behavior with cues but unable to carry over once therapy was over and he was observed walking to his car.    Minutes Performed today Repetitions   Gait /s AD x 15 min                                                 manual therapy techniques: Joint mobilizations, Manual traction, Myofacial release, and Soft tissue Mobilization were applied to the: 0 for 0 minutes, includin    Patient Education and Home Exercises       Education provided:   - proper form for exercise  - neuromuscular re-education to overcome small Parkinson's type movements in functional mobility.    Written Home Exercises Provided:  has been given    Assessment     Patient has tremors over entire body, shuffled Parkinson's type gait, small movements of hips and knees. Patient is learning to make larger movements with gait, has more difficulty with increasing arm swing than stride length.    Reg Is progressing slowly towards his goals.   Pt prognosis is Fair<>Good.     Pt will continue to benefit from skilled outpatient physical therapy to address the deficits listed in the problem list box on initial evaluation, provide pt/family education and to maximize pt's level of independence in the home and community environment.     Pt's spiritual, cultural and educational needs considered and pt agreeable to plan of care and goals.     Anticipated barriers to physical therapy: parkinson's type movements    Goals:     Short Term Goals: 6 weeks   I with HEP  Improve right shld flexion 10% right shld er 10%  Improve right cervical rotation 15%     Long Term  Goals: 12 weeks   Improve right cervical rotation 30%  Decrease pain worst 10/10 to 5/10 with activity.  Improve tinetti score from 21/28 to 25/28.  Generate a discharge FOTO score of 56.  Plan     Plan of care Certification: 6/26/2023 to 9/26/2023.     Outpatient Physical Therapy 2 times weekly for 12 weeks to include the following interventions: Cervical/Lumbar Traction, Electrical Stimulation 0-300 hz, Gait Training, Iontophoresis (with 2.0 ml dexamethasone), Manual Therapy, Moist Heat/ Ice, Neuromuscular Re-ed, Orthotic Management and Training, Patient Education, Therapeutic Activities, Therapeutic Exercise, and Ultrasound.     LUIS CROWELL, PT

## 2023-08-17 ENCOUNTER — CLINICAL SUPPORT (OUTPATIENT)
Dept: REHABILITATION | Facility: HOSPITAL | Age: 73
End: 2023-08-17
Payer: COMMERCIAL

## 2023-08-17 DIAGNOSIS — G20.C PARKINSONISM, UNSPECIFIED PARKINSONISM TYPE: Primary | ICD-10-CM

## 2023-08-17 PROCEDURE — 97116 GAIT TRAINING THERAPY: CPT

## 2023-08-17 PROCEDURE — 97112 NEUROMUSCULAR REEDUCATION: CPT

## 2023-08-17 NOTE — PROGRESS NOTES
[facility]   Physical Therapy Treatment Note      Name: James Martinez  Clinic Number: [id number]    Therapy Diagnosis:   No diagnosis found.    Physician: Kishore Eugene FNP    Visit Date: 8/17/2023  Physician Orders: PT Eval and Treat   Medical Diagnosis from Referral: dizziness  Evaluation Date: 6/26/2023  Authorization Period Expiration: 12/31/2023  Plan of Care Expiration: 9/27/2023  Progress Note Due: 9/27/2023  Visit # / Visits authorized: 6/11  FOTO: 50/56    PTA Visit #: 0/5     Subjective     Pt reports: difficulty walking.  He has been given  Response to previous treatment: no c/o  Functional change: posture improving    Pain: 0/10  Location: na      Objective      Objective Measures updated at progress report unless specified.     Treatment     Reg received the treatments listed below:      therapeutic exercises to develop strength, endurance, ROM, flexibility, posture, and core stabilization    Minutes Performed today Repetitions   cubii  6 min   (B) Heel cord stretch with strap  5 x 30 sh   (B) hamstring stretch with strap  5x30sh   (B) seated hip flexion  Blue x 30   (B) seated hip abd  Blue x 30   (B) seated hip add  Blue x 30   (B seated knee flexion  Blue x 30   Hook lying hip abd  Grey x 30              neuromuscular re-education activities to improve: Balance, Coordination, Proprioception, and Posture.    45 Minutes Performed today Repetitions   Seated flexion to floor x 10x10 sh   bridges  x20   Seated rotation forward to left side then right x [id number]   Forward step and reach left, right x x10   Sideways step and reach left, right x x10   Backward step and reach left, right x x10   Forward rock and reach left, right x x10   Sideways rock and reach left, right x x10                  therapeutic activities to improve functional performance   Minutes Performed today Repetitions   Transfers stand<>floor  15 min                                                 gait  training to improve functional mobility and safety for 15  minutes, including:  Gait /s AD, flexed posture, diminished heel/toe pattern and foot clearance, decreased stride length.  Cues needed to increase arm swing left>right, increase stride length, increased trunk rotation.  He was able to change behavior with cues but unable to carry over once therapy was over and he was observed walking to his car.    Minutes Performed today Repetitions   Gait /s AD x 15 min                                                 manual therapy techniques: Joint mobilizations, Manual traction, Myofacial release, and Soft tissue Mobilization were applied to the: 0 for 0 minutes, includin    Patient Education and Home Exercises       Education provided:   - proper form for exercise  - neuromuscular re-education to overcome small Parkinson's type movements in functional mobility.    Written Home Exercises Provided:  has been given    Assessment     Patient has tremors over entire body, shuffled Parkinson's type gait, small movements of hips and knees. Patient is learning to make larger movements with gait, has more difficulty with increasing arm swing than stride length.  Large posture improving.    Reg Is progressing slowly towards his goals.   Pt prognosis is Fair<>Good.     Pt will continue to benefit from skilled outpatient physical therapy to address the deficits listed in the problem list box on initial evaluation, provide pt/family education and to maximize pt's level of independence in the home and community environment.     Pt's spiritual, cultural and educational needs considered and pt agreeable to plan of care and goals.     Anticipated barriers to physical therapy: parkinson's type movements    Goals:     Short Term Goals: 6 weeks   I with HEP  Improve right shld flexion 10% right shld er 10%  Improve right cervical rotation 15%     Long Term Goals: 12 weeks   Improve right cervical rotation 30%  Decrease pain worst  10/10 to 5/10 with activity.  Improve tinetti score from 21/28 to 25/28.  Generate a discharge FOTO score of 56.  Plan     Plan of care Certification: 6/26/2023 to 9/26/2023.     Outpatient Physical Therapy 2 times weekly for 12 weeks to include the following interventions: Cervical/Lumbar Traction, Electrical Stimulation 0-300 hz, Gait Training, Iontophoresis (with 2.0 ml dexamethasone), Manual Therapy, Moist Heat/ Ice, Neuromuscular Re-ed, Orthotic Management and Training, Patient Education, Therapeutic Activities, Therapeutic Exercise, and Ultrasound.     LUIS CROWELL, PT     Ashley Arriaga - IMELDA

## 2023-08-22 ENCOUNTER — CLINICAL SUPPORT (OUTPATIENT)
Dept: REHABILITATION | Facility: HOSPITAL | Age: 73
End: 2023-08-22
Payer: COMMERCIAL

## 2023-08-22 DIAGNOSIS — R42 DIZZINESS: Chronic | ICD-10-CM

## 2023-08-22 DIAGNOSIS — R26.89 BALANCE PROBLEMS: ICD-10-CM

## 2023-08-22 DIAGNOSIS — R29.898 WEAKNESS OF BOTH LOWER EXTREMITIES: Primary | ICD-10-CM

## 2023-08-22 PROCEDURE — 97116 GAIT TRAINING THERAPY: CPT

## 2023-08-22 PROCEDURE — 97112 NEUROMUSCULAR REEDUCATION: CPT

## 2023-08-22 NOTE — PROGRESS NOTES
[facility]   Physical Therapy Treatment Note      Name: James Martinez  Clinic Number: [id number]    Therapy Diagnosis:   Encounter Diagnoses   Name Primary?    Weakness of both lower extremities Yes    Dizziness     Balance problems        Physician: Kishore Eugene FNP    Visit Date: 8/22/2023  Physician Orders: PT Eval and Treat   Medical Diagnosis from Referral: dizziness  Evaluation Date: 6/26/2023  Authorization Period Expiration: 12/31/2023  Plan of Care Expiration: 9/27/2023  Progress Note Due: 9/27/2023  Visit # / Visits authorized: 7/11  FOTO: 50/56    PTA Visit #: 0/5     Time in:  1505  Time out:  1605  Billable time:  55    Subjective     Pt reports:   He is compliant with HEP  Response to previous treatment: no c/o  Functional change: posture improving; no change in gait    Pain: 0/10  Location: na      Objective      Objective Measures updated at progress report unless specified.     Treatment     Reg received the treatments listed below:      therapeutic exercises to develop strength, endurance, ROM, flexibility, posture, and core stabilization    Minutes Performed today Repetitions   cubii  6 min   (B) Heel cord stretch with strap  5 x 30 sh   (B) hamstring stretch with strap  5x30sh   (B) seated hip flexion  Blue x 30   (B) seated hip abd  Blue x 30   (B) seated hip add  Blue x 30   (B seated knee flexion  Blue x 30   Hook lying hip abd  Grey x 30              neuromuscular re-education activities to improve: Balance, Coordination, Proprioception, and Posture.    40 Minutes Performed today Repetitions   Seated flexion to floor x 10x10 sh   bridges  x20   Seated rotation forward to left side then right x [id number]   Forward step and reach left, right x x10   Sideways step and reach left, right x x10   Backward step and reach left, right x x10   Forward rock and reach left, right  x10   Sideways rock and reach left, right  x10                  therapeutic activities to  improve functional performance   Minutes Performed today Repetitions   Transfers stand<>floor  15 min                                                 gait training to improve functional mobility and safety for 15  minutes, including:  Gait /s AD, flexed posture, diminished heel/toe pattern and foot clearance, decreased stride length. Cues needed to increase arm swing left>right, increase stride length, increased trunk rotation.  He was able to change behavior with cues but unable to carry over once therapy was over and he was observed walking to his car.    Minutes Performed today Repetitions   Gait /s AD x 15 min                                                 manual therapy techniques: Joint mobilizations, Manual traction, Myofacial release, and Soft tissue Mobilization were applied to the: 0 for 0 minutes, includin    Patient Education and Home Exercises       Education provided:   - proper form for exercise  - neuromuscular re-education to overcome small Parkinson's type movements in functional mobility.    Written Home Exercises Provided:  has been given    Assessment     Patient is learning to make larger movements with gait, has more difficulty with increasing arm swing than stride length.  Large posture improving.    Reg Is progressing slowly towards his goals.   Pt prognosis is Fair<>Good.     Pt will continue to benefit from skilled outpatient physical therapy to address the deficits listed in the problem list box on initial evaluation, provide pt/family education and to maximize pt's level of independence in the home and community environment.     Pt's spiritual, cultural and educational needs considered and pt agreeable to plan of care and goals.     Anticipated barriers to physical therapy: parkinson's type movements    Goals:     Short Term Goals: 6 weeks   I with HEP  Improve right shld flexion 10% right shld er 10%  Improve right cervical rotation 15%     Long Term Goals: 12 weeks   Improve  right cervical rotation 30%  Decrease pain worst 10/10 to 5/10 with activity.  Improve tinetti score from 21/28 to 25/28.  Generate a discharge FOTO score of 56.  Plan     Increase carryover tasks to wnl.    Plan of care Certification: 6/26/2023 to 9/26/2023.     Outpatient Physical Therapy 2 times weekly for 12 weeks to include the following interventions: Cervical/Lumbar Traction, Electrical Stimulation 0-300 hz, Gait Training, Iontophoresis (with 2.0 ml dexamethasone), Manual Therapy, Moist Heat/ Ice, Neuromuscular Re-ed, Orthotic Management and Training, Patient Education, Therapeutic Activities, Therapeutic Exercise, and Ultrasound.     LUIS CROWELL, PT

## 2023-08-24 ENCOUNTER — CLINICAL SUPPORT (OUTPATIENT)
Dept: REHABILITATION | Facility: HOSPITAL | Age: 73
End: 2023-08-24
Payer: COMMERCIAL

## 2023-08-24 DIAGNOSIS — R26.89 BALANCE PROBLEMS: ICD-10-CM

## 2023-08-24 DIAGNOSIS — R29.898 WEAKNESS OF BOTH LOWER EXTREMITIES: Primary | ICD-10-CM

## 2023-08-24 DIAGNOSIS — R42 DIZZINESS: Chronic | ICD-10-CM

## 2023-08-24 PROCEDURE — 97116 GAIT TRAINING THERAPY: CPT

## 2023-08-24 PROCEDURE — 97112 NEUROMUSCULAR REEDUCATION: CPT

## 2023-08-24 NOTE — PROGRESS NOTES
[facility]   Physical Therapy Treatment Note      Name: James Martinez  Clinic Number: [id number]    Therapy Diagnosis:   Encounter Diagnoses   Name Primary?    Weakness of both lower extremities Yes    Dizziness     Balance problems        Physician: Kishore Eugene FNP    Visit Date: 8/24/2023  Physician Orders: PT Eval and Treat   Medical Diagnosis from Referral: dizziness  Evaluation Date: 6/26/2023  Authorization Period Expiration: 12/31/2023  Plan of Care Expiration: 9/27/2023  Progress Note Due: 9/27/2023  Visit # / Visits authorized: 7/11  FOTO: 50/56    PTA Visit #: 0/5     Time in:  1505  Time out:  1605  Billable time:  55    Subjective     Pt reports:   He is compliant with HEP  Response to previous treatment: no c/o  Functional change: posture improving; no change in gait    Pain: 0/10  Location: na      Objective      Objective Measures updated at progress report unless specified.     Treatment     Reg received the treatments listed below:      therapeutic exercises to develop strength, endurance, ROM, flexibility, posture, and core stabilization    Minutes Performed today Repetitions   cubii  6 min   (B) Heel cord stretch with strap  5 x 30 sh   (B) hamstring stretch with strap  5x30sh   (B) seated hip flexion  Blue x 30   (B) seated hip abd  Blue x 30   (B) seated hip add  Blue x 30   (B seated knee flexion  Blue x 30   Hook lying hip abd  Grey x 30              neuromuscular re-education activities to improve: Balance, Coordination, Proprioception, and Posture.    40 Minutes Performed today Repetitions   Seated flexion to floor x 10x10 sh   bridges  x20   Seated rotation forward to left side then right x [id number]   Forward step and reach left, right x x10   Sideways step and reach left, right x x10   Backward step and reach left, right x x10   Forward rock and reach left, right x x10   Sideways rock and reach left, right x x10                  therapeutic activities to  improve functional performance   10 Minutes Performed today Repetitions   Transfers stand<>floor  15 min   In/out of car x x10   Pull chair out, sit in chair, scoot chair to table     Walking and turning big x 5 min                                  gait training to improve functional mobility and safety for 15  minutes, including:  Gait /s AD, flexed posture, diminished heel/toe pattern and foot clearance, decreased stride length. Cues needed to increase arm swing left>right, increase stride length, increased trunk rotation.  He was able to change behavior with cues but unable to carry over once therapy was over and he was observed walking to his car.    Minutes Performed today Repetitions   Gait /s AD x 15 min                                                 manual therapy techniques: Joint mobilizations, Manual traction, Myofacial release, and Soft tissue Mobilization were applied to the: 0 for 0 minutes, includin    Patient Education and Home Exercises       Education provided:   - proper form for exercise  - neuromuscular re-education to overcome small Parkinson's type movements in functional mobility.    Written Home Exercises Provided:  has been given    Assessment     Patient is learning to make larger movements with gait, has more difficulty with increasing arm swing than stride length.  Large posture improving.    Reg Is progressing slowly towards his goals.   Pt prognosis is Fair<>Good.     Pt will continue to benefit from skilled outpatient physical therapy to address the deficits listed in the problem list box on initial evaluation, provide pt/family education and to maximize pt's level of independence in the home and community environment.     Pt's spiritual, cultural and educational needs considered and pt agreeable to plan of care and goals.     Anticipated barriers to physical therapy: parkinson's type movements    Goals:     Short Term Goals: 6 weeks   I with HEP  Improve right shld flexion 10%  right shld er 10%  Improve right cervical rotation 15%     Long Term Goals: 12 weeks   Improve right cervical rotation 30%  Decrease pain worst 10/10 to 5/10 with activity.  Improve tinetti score from 21/28 to 25/28.  Generate a discharge FOTO score of 56.  Plan     Increase carryover tasks to wnl.    Plan of care Certification: 6/26/2023 to 9/26/2023.     Outpatient Physical Therapy 2 times weekly for 12 weeks to include the following interventions: Cervical/Lumbar Traction, Electrical Stimulation 0-300 hz, Gait Training, Iontophoresis (with 2.0 ml dexamethasone), Manual Therapy, Moist Heat/ Ice, Neuromuscular Re-ed, Orthotic Management and Training, Patient Education, Therapeutic Activities, Therapeutic Exercise, and Ultrasound.     DULCE MARIA CROWELL, PT      Patient did not return to Therapy following this treatment on 8/24/23. Therefore patient is discharged from Physical Therapy services at current level of function as listed above.     Dulce Maria Crowell, MSPT  Physical Therapist

## 2023-10-17 PROBLEM — R42 DIZZINESS: Chronic | Status: RESOLVED | Noted: 2021-09-11 | Resolved: 2023-10-17

## 2023-10-17 PROBLEM — R26.89 BALANCE PROBLEMS: Status: RESOLVED | Noted: 2022-07-07 | Resolved: 2023-10-17

## 2023-10-17 PROBLEM — R29.898 WEAKNESS OF BOTH LOWER EXTREMITIES: Status: RESOLVED | Noted: 2022-06-08 | Resolved: 2023-10-17

## 2024-01-13 DIAGNOSIS — R42 DIZZINESS: ICD-10-CM

## 2024-01-17 RX ORDER — MECLIZINE HYDROCHLORIDE 25 MG/1
TABLET ORAL
Qty: 90 TABLET | Refills: 1 | Status: SHIPPED | OUTPATIENT
Start: 2024-01-17 | End: 2024-03-13

## 2024-03-12 DIAGNOSIS — R42 DIZZINESS: ICD-10-CM

## 2024-03-13 RX ORDER — MECLIZINE HYDROCHLORIDE 25 MG/1
25 TABLET ORAL
Qty: 90 TABLET | Refills: 1 | Status: SHIPPED | OUTPATIENT
Start: 2024-03-13

## 2024-07-10 DIAGNOSIS — R42 DIZZINESS: ICD-10-CM

## 2024-07-11 ENCOUNTER — TELEPHONE (OUTPATIENT)
Dept: NEUROLOGY | Facility: CLINIC | Age: 74
End: 2024-07-11
Payer: COMMERCIAL

## 2024-07-11 RX ORDER — MECLIZINE HYDROCHLORIDE 25 MG/1
TABLET ORAL
Qty: 90 TABLET | Refills: 1 | Status: SHIPPED | OUTPATIENT
Start: 2024-07-11

## 2024-07-11 NOTE — TELEPHONE ENCOUNTER
Called pt told him refill sent in. He v/u.      ----- Message from Jessica Szymanski sent at 7/11/2024 10:08 AM CDT -----  Pt has been out of meclizine (ANTIVERT) 25 mg tablet for a week and needs to [facility].   Who Called: James Martinez    Refill or New Rx:Refill        Preferred Method of Contact: Phone Call  Patient's Preferred Phone Number on File: [phone]   Best Call Back Number, if different:  Additional Information:

## 2024-07-30 ENCOUNTER — OFFICE VISIT (OUTPATIENT)
Dept: OTOLARYNGOLOGY | Facility: CLINIC | Age: 74
End: 2024-07-30
Payer: COMMERCIAL

## 2024-07-30 VITALS — WEIGHT: 227 LBS | HEIGHT: 75 IN | BODY MASS INDEX: 28.23 KG/M2

## 2024-07-30 DIAGNOSIS — K11.20 SIALOADENITIS OF SUBMANDIBULAR GLAND: ICD-10-CM

## 2024-07-30 DIAGNOSIS — R42 VERTIGO: Primary | ICD-10-CM

## 2024-07-30 PROCEDURE — 1159F MED LIST DOCD IN RCRD: CPT | Mod: CPTII,,, | Performed by: OTOLARYNGOLOGY

## 2024-07-30 PROCEDURE — 99999 PR PBB SHADOW E&M-EST. PATIENT-LVL III: CPT | Mod: PBBFAC,,, | Performed by: OTOLARYNGOLOGY

## 2024-07-30 PROCEDURE — 99213 OFFICE O/P EST LOW 20 MIN: CPT | Mod: PBBFAC | Performed by: OTOLARYNGOLOGY

## 2024-07-30 PROCEDURE — 1160F RVW MEDS BY RX/DR IN RCRD: CPT | Mod: CPTII,,, | Performed by: OTOLARYNGOLOGY

## 2024-07-30 PROCEDURE — 99214 OFFICE O/P EST MOD 30 MIN: CPT | Mod: S$PBB,,, | Performed by: OTOLARYNGOLOGY

## 2024-07-30 PROCEDURE — 4010F ACE/ARB THERAPY RXD/TAKEN: CPT | Mod: CPTII,,, | Performed by: OTOLARYNGOLOGY

## 2024-07-30 PROCEDURE — 3008F BODY MASS INDEX DOCD: CPT | Mod: CPTII,,, | Performed by: OTOLARYNGOLOGY

## 2024-07-30 RX ORDER — MECLIZINE HYDROCHLORIDE 25 MG/1
25 TABLET ORAL 3 TIMES DAILY PRN
Qty: 90 TABLET | Refills: 0 | Status: SHIPPED | OUTPATIENT
Start: 2024-07-30

## 2024-07-30 NOTE — PROGRESS NOTES
Subjective:       Patient ID: James Martinez is a 73 y.o. male.    Chief Complaint: Neck Swelling (Patient complains of swelling on both sides of his neck.), Otalgia (Patient complains of pain in his left ear.), and Dizziness (Patient is requesting a refill on his Meclizine for dizziness.)    Head and Neck Mass:    Associated symptoms: Ear pain.    Otalgia     Dizziness:    Associated symptoms: ear pain.    Review of Systems   HENT:  Positive for ear pain and facial swelling.    Neurological:  Positive for dizziness.   All other systems reviewed and are negative.      Objective:      Physical Exam  General: NAD  Head: Normocephalic, atraumatic, no facial asymmetry/normal strength,  Ears: Both auricules normal in appearance, w/o deformities tympanic membranes normal external auditory canals normal  Nose: External nose w/o deformities normal turbinates no drainage or inflammation  Oral Cavity: Lips, gums, floor of mouth, tongue hard palate, and buccal mucosa without mass/lesion  Oropharynx: Mucosa pink and moist, soft palate, posterior pharynx and oropharyngeal wall without mass/lesion  Neck: Supple, symmetric, trachea midline, no palpable mass/lesion, no palpable cervical lymphadenopathy swollen around submandibular glands but currently better   Skin: Warm and dry, no concerning lesions  Respiratory: Respirations even, unlabored  Assessment:       1. Vertigo    2. Sialoadenitis of submandibular gland        Plan:       Hydration  Antivert   F/u if glands enlarge again

## 2024-09-09 ENCOUNTER — OFFICE VISIT (OUTPATIENT)
Dept: NEUROLOGY | Facility: CLINIC | Age: 74
End: 2024-09-09
Payer: COMMERCIAL

## 2024-09-09 VITALS
HEIGHT: 75 IN | SYSTOLIC BLOOD PRESSURE: 162 MMHG | DIASTOLIC BLOOD PRESSURE: 74 MMHG | OXYGEN SATURATION: 97 % | WEIGHT: 227.19 LBS | HEART RATE: 78 BPM | BODY MASS INDEX: 28.25 KG/M2

## 2024-09-09 DIAGNOSIS — R29.818 PARKINSONIAN FEATURES: Primary | ICD-10-CM

## 2024-09-09 DIAGNOSIS — R26.89 BALANCE PROBLEMS: ICD-10-CM

## 2024-09-09 DIAGNOSIS — R42 DIZZINESS: ICD-10-CM

## 2024-09-09 PROCEDURE — 99213 OFFICE O/P EST LOW 20 MIN: CPT | Mod: S$PBB,,, | Performed by: NURSE PRACTITIONER

## 2024-09-09 PROCEDURE — 4010F ACE/ARB THERAPY RXD/TAKEN: CPT | Mod: CPTII,,, | Performed by: NURSE PRACTITIONER

## 2024-09-09 PROCEDURE — 1126F AMNT PAIN NOTED NONE PRSNT: CPT | Mod: CPTII,,, | Performed by: NURSE PRACTITIONER

## 2024-09-09 PROCEDURE — 3288F FALL RISK ASSESSMENT DOCD: CPT | Mod: CPTII,,, | Performed by: NURSE PRACTITIONER

## 2024-09-09 PROCEDURE — 3077F SYST BP >= 140 MM HG: CPT | Mod: CPTII,,, | Performed by: NURSE PRACTITIONER

## 2024-09-09 PROCEDURE — 3078F DIAST BP <80 MM HG: CPT | Mod: CPTII,,, | Performed by: NURSE PRACTITIONER

## 2024-09-09 PROCEDURE — 99999 PR PBB SHADOW E&M-EST. PATIENT-LVL V: CPT | Mod: PBBFAC,,, | Performed by: NURSE PRACTITIONER

## 2024-09-09 PROCEDURE — 1160F RVW MEDS BY RX/DR IN RCRD: CPT | Mod: CPTII,,, | Performed by: NURSE PRACTITIONER

## 2024-09-09 PROCEDURE — 1101F PT FALLS ASSESS-DOCD LE1/YR: CPT | Mod: CPTII,,, | Performed by: NURSE PRACTITIONER

## 2024-09-09 PROCEDURE — 1159F MED LIST DOCD IN RCRD: CPT | Mod: CPTII,,, | Performed by: NURSE PRACTITIONER

## 2024-09-09 PROCEDURE — 3008F BODY MASS INDEX DOCD: CPT | Mod: CPTII,,, | Performed by: NURSE PRACTITIONER

## 2024-09-09 PROCEDURE — 99215 OFFICE O/P EST HI 40 MIN: CPT | Mod: PBBFAC | Performed by: NURSE PRACTITIONER

## 2024-09-09 RX ORDER — CARBIDOPA AND LEVODOPA 25; 100 MG/1; MG/1
TABLET ORAL
Qty: 90 TABLET | Refills: 11 | Status: SHIPPED | OUTPATIENT
Start: 2024-09-09

## 2024-09-09 NOTE — PROGRESS NOTES
Subjective:       Patient ID: James Martinez is a 73 y.o. male     Chief Complaint:    Chief Complaint   Patient presents with    Follow-up        Allergies:  Pcn [penicillins], Penicillamine, and Weed pollen-giant (tall) ragweed    Current Medications:    Outpatient Encounter Medications as of 9/9/2024   Medication Sig Dispense Refill    amLODIPine (NORVASC) 5 MG tablet Take 5 mg by mouth once daily.      aspirin 81 MG Chew Take 81 mg by mouth once daily.      cetirizine (ZYRTEC) 10 MG tablet       fluticasone propionate (FLONASE) 50 mcg/actuation nasal spray INHALE 1 TO 2 PUFFS IN EACH NOSTRIL AT BEDTIME      losartan (COZAAR) 50 MG tablet Take 50 mg by mouth once daily.      meclizine (ANTIVERT) 25 mg tablet Take 1 tablet (25 mg total) by mouth 3 (three) times daily as needed for Dizziness. 90 tablet 0    montelukast (SINGULAIR) 10 mg tablet       carbidopa-levodopa  mg (SINEMET)  mg per tablet 1 tablet daily when you awake for a week then take 1 tablet twice daily for a week then take 1 tablet 3 times daily, with the last tablet being about 3 hours prior to sleep.  Avoid taking with food 90 tablet 11    diazePAM (VALIUM) 5 MG tablet Take 1 tablet (5 mg total) by mouth every 6 (six) hours as needed for Anxiety (1 tab 30 minutes prior to MRI and 1 tab at time of MRI). 2 tablet 0    ergocalciferol (ERGOCALCIFEROL) 50,000 unit Cap Take one capsule monthly (Patient not taking: Reported on 9/9/2024) 12 capsule 1    meclizine (ANTIVERT) 25 mg tablet TAKE 1 TABLET BY MOUTH THREE TIMES A DAY AS NEEDED FOR DIZZINESS 90 tablet 1     No facility-administered encounter medications on file as of 9/9/2024.       History of Present Illness  72 y/o male following in neurology for reported chronic dizziness, his last clinic visit with us was in May of 2023, thus 16 months ago.    Prior followed by IZABELLA Jimenez    Apparently onset of dizziness was 2018.  He has had extensive workup by both neurology and  cardiology.  Actually seen by Dr. Molina at [facility] previously for 2nd opinion and reported they ruled out vertigo.      Prior also reported weakness in BLE with burning and stinging.  And was referred prior for EMG which per the EMR was done at [facility] in June of 2022, indicated NO large fiber neuropathy, but did question a lumbar radiculopathy at L5/S1, but was limited exam due to swelling.  There was no reported lower back pain at that time.  Prior treated with neurontin but was tapered off, unclear why but possibly swelling.  They reported at last visit 16 months ago that cymbalta was not effective.    Prior MRI brain 6/4/21 no acute findings.  MRI cervical spine 10/8/21 at [facility] no reported significant findings.  MRI T spine 10/8/21 at [facility] reported fracture at T4 with noted hemangiomas.  MRI lumbar spine 6/23/22 multilevel degenerative changes.    At his last evaluation he had mask like appearance, noted on ambulation patient has stiffness in arms and shuffling in his gait, mild bradykinesia.  He reported sometimes with resting tremor.  Denies family history.  I discussed with him about parkinsonian symptoms, given his gait difficulty I ordered a TIGIST scan, however, it appears the TIGIST scan was denied, but I was not made aware of this and no reason for the denial was provided.  Again, this was 16 months ago, he has not followed back.  Today, his symptoms aer more pronounced.  I feel even more now need for TIGIST scan to confirm, but in meantime I am going to start him with trial of sinemet.           Review of Systems  Review of Systems   Constitutional:  Negative for diaphoresis and fever.   HENT:  Negative for congestion, hearing loss and tinnitus.    Eyes:  Negative for blurred vision, double vision, photophobia, discharge and redness.   Respiratory:  Negative for cough and shortness of breath.    Cardiovascular:  Negative for chest pain.   Gastrointestinal:  Negative for abdominal pain, nausea and vomiting.    Musculoskeletal:  Positive for falls. Negative for back pain, joint pain, myalgias and neck pain.   Skin:  Negative for itching and rash.   Neurological:  Positive for tremors. Negative for dizziness, sensory change, speech change, focal weakness, seizures, loss of consciousness, weakness and headaches.   Psychiatric/Behavioral:  Negative for depression, hallucinations and memory loss. The patient does not have insomnia.    All other systems reviewed and are negative.     Objective:     NEUROLOGICAL EXAMINATION:     MENTAL STATUS   Oriented to person, place, and time.   Registration: recalls 3 of 3 objects. Recall at 5 minutes: recalls 3 of 3 objects.   Attention: normal. Concentration: normal.   Speech: speech is normal   Level of consciousness: alert  Knowledge: good and consistent with education.   Normal comprehension.     CRANIAL NERVES     CN II   Visual fields full to confrontation.   Visual acuity: normal  Right visual field deficit: none  Left visual field deficit: none     CN III, IV, VI   Pupils are equal, round, and reactive to light.  Extraocular motions are normal.   Right pupil: Size: 3 mm. Shape: regular. Reactivity: brisk. Consensual response: intact. Accommodation: intact.   Left pupil: Size: 3 mm. Shape: regular. Reactivity: brisk. Consensual response: intact. Accommodation: intact.   CN III: no CN III palsy  CN VI: no CN VI palsy  Nystagmus: none   Diplopia: none  Upgaze: normal  Downgaze: normal  Conjugate gaze: present  Vestibulo-ocular reflex: present    CN V   Facial sensation intact.   Right facial sensation deficit: none  Left facial sensation deficit: none  Right corneal reflex: normal  Left corneal reflex: normal    CN VII   Facial expression full, symmetric.   Right facial weakness: none  Left facial weakness: none  Right taste: normal  Left taste: normal    CN VIII   CN VIII normal.   Hearing: intact    CN IX, X   CN IX normal.   CN X normal.   Palate: symmetric    CN XI   CN XI  normal.   Right sternocleidomastoid strength: normal  Left sternocleidomastoid strength: normal  Right trapezius strength: normal  Left trapezius strength: normal    CN XII   CN XII normal.   Tongue: not atrophic  Fasciculations: absent  Tongue deviation: none    MOTOR EXAM   Muscle bulk: normal  Overall muscle tone: normal  Right arm tone: normal  Left arm tone: normal  Right arm pronator drift: absent  Left arm pronator drift: absent  Right leg tone: normal  Left leg tone: normal    Strength   Right neck flexion: 5/5  Left neck flexion: 5/5  Right neck extension: 5/5  Left neck extension: 5/5  Right deltoid: 5/5  Left deltoid: 5/5  Right biceps: 5/5  Left biceps: 5/5  Right triceps: 5/5  Left triceps: 5/5  Right wrist flexion: 5/5  Left wrist flexion: 5/5  Right wrist extension: 5/5  Left wrist extension: 5/5  Right interossei: 5/5  Left interossei: 5/5  Right iliopsoas: 5/5  Left iliopsoas: 5/5  Right quadriceps: 5/5  Left quadriceps: 5/5  Right hamstrin/5  Left hamstrin/5  Right anterior tibial: 5/5  Left anterior tibial: 5/5  Right posterior tibial: 5/5  Left posterior tibial: 5/5  Right gastroc: 5/5  Left gastroc: 5/5    REFLEXES     Reflexes   Right brachioradialis: 2+  Left brachioradialis: 2+  Right biceps: 2+  Left biceps: 2+  Right triceps: 2+  Left triceps: 2+  Right patellar: 2+  Left patellar: 2+  Right achilles: 2+  Left achilles: 2+  Right plantar: normal  Left plantar: normal  Right Jefferson: absent  Left Jefferson: absent  Right ankle clonus: absent  Left ankle clonus: absent  Right pendular knee jerk: absent  Left pendular knee jerk: absent    SENSORY EXAM   Light touch normal.   Right arm light touch: normal  Left arm light touch: normal  Right leg light touch: normal  Left leg light touch: normal  Vibration normal.   Right arm vibration: normal  Left arm vibration: normal  Right leg vibration: normal  Left leg vibration: normal  Proprioception normal.   Right arm proprioception:  normal  Left arm proprioception: normal  Right leg proprioception: normal  Left leg proprioception: normal  Pinprick normal.   Right arm pinprick: normal  Left arm pinprick: normal  Right leg pinprick: normal  Left leg pinprick: normal  Graphesthesia: normal  Romberg: negative  Stereognosis: normal    GAIT AND COORDINATION     Gait  Gait: shuffling     Coordination   Finger to nose coordination: normal  Heel to shin coordination: normal  Tandem walking coordination: abnormal    Tremor   Resting tremor: present  Intention tremor: absent  Action tremor: absent       Physical Exam  Vitals and nursing note reviewed.   Constitutional:       Appearance: Normal appearance.   HENT:      Head: Normocephalic.   Eyes:      Extraocular Movements: EOM normal.      Pupils: Pupils are equal, round, and reactive to light.   Cardiovascular:      Rate and Rhythm: Normal rate and regular rhythm.      Pulses: Normal pulses.      Heart sounds: Normal heart sounds.   Pulmonary:      Effort: Pulmonary effort is normal.      Breath sounds: Normal breath sounds.   Musculoskeletal:         General: Normal range of motion.      Cervical back: Normal range of motion and neck supple.   Skin:     General: Skin is warm and dry.   Neurological:      General: No focal deficit present.      Mental Status: He is alert and oriented to person, place, and time.      Cranial Nerves: No cranial nerve deficit.      Sensory: No sensory deficit.      Motor: No weakness.      Coordination: Coordination normal. Finger-Nose-Finger Test, Heel to Shin Test and Romberg Test normal.      Gait: Gait abnormal and tandem walk abnormal.      Deep Tendon Reflexes: Reflexes normal.      Reflex Scores:       Tricep reflexes are 2+ on the right side and 2+ on the left side.       Bicep reflexes are 2+ on the right side and 2+ on the left side.       Brachioradialis reflexes are 2+ on the right side and 2+ on the left side.       Patellar reflexes are 2+ on the right side  and 2+ on the left side.       Achilles reflexes are 2+ on the right side and 2+ on the left side.  Psychiatric:         Mood and Affect: Mood normal.         Speech: Speech normal.         Behavior: Behavior normal.          Assessment:     Problem List Items Addressed This Visit          Neuro    Parkinsonian features - Primary    Relevant Medications    carbidopa-levodopa  mg (SINEMET)  mg per tablet    Other Relevant Orders    NM Brain 3D Imaging Spect          Primary Diagnosis and ICD10  Parkinsonian features [R29.818]    Plan:     Patient Instructions   TIGIST scan  Start trial with sinemet and take as directed  Follow-up 2 months    There are no discontinued medications.      Requested Prescriptions     Signed Prescriptions Disp Refills    carbidopa-levodopa  mg (SINEMET)  mg per tablet 90 tablet 11     Si tablet daily when you awake for a week then take 1 tablet twice daily for a week then take 1 tablet 3 times daily, with the last tablet being about 3 hours prior to sleep.  Avoid taking with food       Orders Placed This Encounter   Procedures    NM Brain 3D Imaging Spect

## 2024-09-10 ENCOUNTER — TELEPHONE (OUTPATIENT)
Dept: NEUROLOGY | Facility: CLINIC | Age: 74
End: 2024-09-10
Payer: COMMERCIAL

## 2024-09-10 NOTE — TELEPHONE ENCOUNTER
Called pt again and instructed him to take Sinemet 1 tab daily x 1 week, then 1 tab twice a day x 1 week then 1 tab  three times a day but last dose three hrs before bedtime. He v/u.          9-10-24 AT 1:22 returned call to pt got v/m. Left message returning call          ----- Message from Ana Parekh sent at 9/10/2024 11:50 AM CDT -----  Regarding: SPEAK WITH NURSE  Who Called: James Martinez      Who Left Message for Patient:  Does the patient know what this is regarding?:YES      Preferred Method of Contact: Phone Call  Patient's Preferred Phone Number on File: [phone]   Best Call Back Number, if different:  Additional Information: NEEDS TO SPEAK WITH  THE NURSE TO MAKE SURE HE IS TAKING MEDICATION CORRECTLY

## 2024-10-18 DIAGNOSIS — R42 VERTIGO: ICD-10-CM

## 2024-10-18 RX ORDER — MECLIZINE HYDROCHLORIDE 25 MG/1
TABLET ORAL
Qty: 90 TABLET | Refills: 0 | Status: SHIPPED | OUTPATIENT
Start: 2024-10-18

## 2024-11-14 ENCOUNTER — OFFICE VISIT (OUTPATIENT)
Dept: NEUROLOGY | Facility: CLINIC | Age: 74
End: 2024-11-14
Payer: COMMERCIAL

## 2024-11-14 VITALS
WEIGHT: 230.81 LBS | SYSTOLIC BLOOD PRESSURE: 173 MMHG | HEIGHT: 75 IN | RESPIRATION RATE: 18 BRPM | OXYGEN SATURATION: 97 % | DIASTOLIC BLOOD PRESSURE: 94 MMHG | BODY MASS INDEX: 28.7 KG/M2 | HEART RATE: 71 BPM

## 2024-11-14 DIAGNOSIS — R29.818 PARKINSONIAN FEATURES: ICD-10-CM

## 2024-11-14 DIAGNOSIS — G20.B1 PARKINSON'S DISEASE WITH DYSKINESIA WITHOUT FLUCTUATING MANIFESTATIONS: Primary | ICD-10-CM

## 2024-11-14 DIAGNOSIS — R26.9 GAIT DIFFICULTY: ICD-10-CM

## 2024-11-14 PROCEDURE — 1159F MED LIST DOCD IN RCRD: CPT | Mod: CPTII,,, | Performed by: NURSE PRACTITIONER

## 2024-11-14 PROCEDURE — 1101F PT FALLS ASSESS-DOCD LE1/YR: CPT | Mod: CPTII,,, | Performed by: NURSE PRACTITIONER

## 2024-11-14 PROCEDURE — 3288F FALL RISK ASSESSMENT DOCD: CPT | Mod: CPTII,,, | Performed by: NURSE PRACTITIONER

## 2024-11-14 PROCEDURE — 99213 OFFICE O/P EST LOW 20 MIN: CPT | Mod: S$PBB,,, | Performed by: NURSE PRACTITIONER

## 2024-11-14 PROCEDURE — 1160F RVW MEDS BY RX/DR IN RCRD: CPT | Mod: CPTII,,, | Performed by: NURSE PRACTITIONER

## 2024-11-14 PROCEDURE — 3080F DIAST BP >= 90 MM HG: CPT | Mod: CPTII,,, | Performed by: NURSE PRACTITIONER

## 2024-11-14 PROCEDURE — 3008F BODY MASS INDEX DOCD: CPT | Mod: CPTII,,, | Performed by: NURSE PRACTITIONER

## 2024-11-14 PROCEDURE — 3077F SYST BP >= 140 MM HG: CPT | Mod: CPTII,,, | Performed by: NURSE PRACTITIONER

## 2024-11-14 PROCEDURE — 99215 OFFICE O/P EST HI 40 MIN: CPT | Mod: PBBFAC | Performed by: NURSE PRACTITIONER

## 2024-11-14 PROCEDURE — 4010F ACE/ARB THERAPY RXD/TAKEN: CPT | Mod: CPTII,,, | Performed by: NURSE PRACTITIONER

## 2024-11-14 PROCEDURE — 1126F AMNT PAIN NOTED NONE PRSNT: CPT | Mod: CPTII,,, | Performed by: NURSE PRACTITIONER

## 2024-11-14 PROCEDURE — 99999 PR PBB SHADOW E&M-EST. PATIENT-LVL V: CPT | Mod: PBBFAC,,, | Performed by: NURSE PRACTITIONER

## 2024-11-14 RX ORDER — CARBIDOPA AND LEVODOPA 25; 100 MG/1; MG/1
TABLET ORAL
Qty: 150 TABLET | Refills: 11 | Status: SHIPPED | OUTPATIENT
Start: 2024-11-14

## 2024-11-14 RX ORDER — DULOXETIN HYDROCHLORIDE 20 MG/1
20 CAPSULE, DELAYED RELEASE ORAL
COMMUNITY

## 2024-11-14 NOTE — PROGRESS NOTES
Subjective:       Patient ID: James Martinez is a 74 y.o. male     Chief Complaint:    Chief Complaint   Patient presents with    Follow-up        Allergies:  Pcn [penicillins], Penicillamine, and Weed pollen-giant (tall) ragweed    Current Medications:    Outpatient Encounter Medications as of 11/14/2024   Medication Sig Dispense Refill    amLODIPine (NORVASC) 5 MG tablet Take 5 mg by mouth once daily.      aspirin 81 MG Chew Take 81 mg by mouth once daily.      cetirizine (ZYRTEC) 10 MG tablet       DULoxetine (CYMBALTA) 20 MG capsule Take 20 mg by mouth.      fluticasone propionate (FLONASE) 50 mcg/actuation nasal spray INHALE 1 TO 2 PUFFS IN EACH NOSTRIL AT BEDTIME      losartan (COZAAR) 50 MG tablet Take 50 mg by mouth once daily.      meclizine (ANTIVERT) 25 mg tablet TAKE 1 TABLET BY MOUTH 3 TIMES DAILY AS NEEDED FOR DIZZINESS. 90 tablet 0    montelukast (SINGULAIR) 10 mg tablet       [DISCONTINUED] carbidopa-levodopa  mg (SINEMET)  mg per tablet 1 tablet daily when you awake for a week then take 1 tablet twice daily for a week then take 1 tablet 3 times daily, with the last tablet being about 3 hours prior to sleep.  Avoid taking with food 90 tablet 11    carbidopa-levodopa  mg (SINEMET)  mg per tablet Take 1.5 tablets three times daily 150 tablet 11    diazePAM (VALIUM) 5 MG tablet Take 1 tablet (5 mg total) by mouth every 6 (six) hours as needed for Anxiety (1 tab 30 minutes prior to MRI and 1 tab at time of MRI). 2 tablet 0    ergocalciferol (ERGOCALCIFEROL) 50,000 unit Cap Take one capsule monthly (Patient not taking: Reported on 11/14/2024) 12 capsule 1    meclizine (ANTIVERT) 25 mg tablet TAKE 1 TABLET BY MOUTH THREE TIMES A DAY AS NEEDED FOR DIZZINESS 90 tablet 1    [DISCONTINUED] meclizine (ANTIVERT) 25 mg tablet Take 1 tablet (25 mg total) by mouth 3 (three) times daily as needed for Dizziness. 90 tablet 0     No facility-administered encounter medications on file as of  11/14/2024.       History of Present Illness  73 y/o male following in neurology for reported chronic dizziness initially, but prior to his last visit 2 months ago he had not been seen in clinic for 15 months.    Apparently onset of dizziness was 2018.  He has had extensive workup by both neurology and cardiology.  Actually seen by Dr. Molina at [facility] previously for 2nd opinion and reported they ruled out vertigo.      Prior also reported weakness in BLE with burning and stinging.  And was referred prior for EMG which per the EMR was done at [facility] in June of 2022, indicated NO large fiber neuropathy, but did question a lumbar radiculopathy at L5/S1, but was limited exam due to swelling.  There was no reported lower back pain at that time.  Prior treated with neurontin but was tapered off, unclear why but possibly swelling.  Prior cymbalta was not effective.    Prior MRI brain 6/4/21 no acute findings.  MRI cervical spine 10/8/21 at [facility] no reported significant findings.  MRI T spine 10/8/21 at [facility] reported fracture at T4 with noted hemangiomas.  MRI lumbar spine 6/23/22 multilevel degenerative changes.    At his prior visits we had noted changes in his presentation.  He was developing a mask like appearance, bradykinesia, mild as well.  Initially denied tremor, but last visit reported he had begun to notice this as well.  Had concern about parkinsonian presentation and I referred him for TIGIST sca, done 11/7/24 and noted abnormal which is why he is back today to discuss results.  I had also started him with trial of sinemet 25/100 tapering to 1 tablet tid.  Today on exam his bradykinesia is slightly improved.  Though feel we can certainly try to increase dosing.  I strongly recommend PT for mobility as he is noted to stumble several times and with forward lean and need to try to avoid falls if possible.           Review of Systems  Review of Systems   Constitutional:  Negative for diaphoresis and fever.   HENT:   Negative for congestion, hearing loss and tinnitus.    Eyes:  Negative for blurred vision, double vision, photophobia, discharge and redness.   Respiratory:  Negative for cough and shortness of breath.    Cardiovascular:  Negative for chest pain.   Gastrointestinal:  Negative for abdominal pain, nausea and vomiting.   Musculoskeletal:  Positive for falls. Negative for back pain, joint pain, myalgias and neck pain.   Skin:  Negative for itching and rash.   Neurological:  Positive for tremors. Negative for dizziness, sensory change, speech change, focal weakness, seizures, loss of consciousness, weakness and headaches.   Psychiatric/Behavioral:  Negative for depression, hallucinations and memory loss. The patient does not have insomnia.    All other systems reviewed and are negative.     Objective:     NEUROLOGICAL EXAMINATION:     MENTAL STATUS   Oriented to person, place, and time.   Registration: recalls 3 of 3 objects. Recall at 5 minutes: recalls 3 of 3 objects.   Attention: normal. Concentration: normal.   Speech: speech is normal   Level of consciousness: alert  Knowledge: good and consistent with education.   Normal comprehension.     CRANIAL NERVES     CN II   Visual fields full to confrontation.   Visual acuity: normal  Right visual field deficit: none  Left visual field deficit: none     CN III, IV, VI   Pupils are equal, round, and reactive to light.  Extraocular motions are normal.   Right pupil: Size: 3 mm. Shape: regular. Reactivity: brisk. Consensual response: intact. Accommodation: intact.   Left pupil: Size: 3 mm. Shape: regular. Reactivity: brisk. Consensual response: intact. Accommodation: intact.   CN III: no CN III palsy  CN VI: no CN VI palsy  Nystagmus: none   Diplopia: none  Upgaze: normal  Downgaze: normal  Conjugate gaze: present  Vestibulo-ocular reflex: present    CN V   Facial sensation intact.   Right facial sensation deficit: none  Left facial sensation deficit: none  Right corneal  reflex: normal  Left corneal reflex: normal    CN VII   Facial expression full, symmetric.   Right facial weakness: none  Left facial weakness: none  Right taste: normal  Left taste: normal    CN VIII   CN VIII normal.   Hearing: intact    CN IX, X   CN IX normal.   CN X normal.   Palate: symmetric    CN XI   CN XI normal.   Right sternocleidomastoid strength: normal  Left sternocleidomastoid strength: normal  Right trapezius strength: normal  Left trapezius strength: normal    CN XII   CN XII normal.   Tongue: not atrophic  Fasciculations: absent  Tongue deviation: none    MOTOR EXAM   Muscle bulk: normal  Overall muscle tone: normal  Right arm tone: normal  Left arm tone: normal  Right arm pronator drift: absent  Left arm pronator drift: absent  Right leg tone: normal  Left leg tone: normal    Strength   Right neck flexion: 5/5  Left neck flexion: 5/5  Right neck extension: 5/5  Left neck extension: 5/5  Right deltoid: 5/5  Left deltoid: 5/5  Right biceps: 5/5  Left biceps: 5/5  Right triceps: 5/5  Left triceps: 5/5  Right wrist flexion: 5/5  Left wrist flexion: 5/5  Right wrist extension: 5/5  Left wrist extension: 5/5  Right interossei: 5/5  Left interossei: 5/5  Right iliopsoas: 5/5  Left iliopsoas: 5/5  Right quadriceps: 5/5  Left quadriceps: 5/5  Right hamstrin/5  Left hamstrin/5  Right anterior tibial: 5/5  Left anterior tibial: 5/5  Right posterior tibial: 5/5  Left posterior tibial: 5/5  Right gastroc: 5/5  Left gastroc: 5/5    REFLEXES     Reflexes   Right brachioradialis: 2+  Left brachioradialis: 2+  Right biceps: 2+  Left biceps: 2+  Right triceps: 2+  Left triceps: 2+  Right patellar: 2+  Left patellar: 2+  Right achilles: 2+  Left achilles: 2+  Right plantar: normal  Left plantar: normal  Right Jefferson: absent  Left Jefferson: absent  Right ankle clonus: absent  Left ankle clonus: absent  Right pendular knee jerk: absent  Left pendular knee jerk: absent    SENSORY EXAM   Light touch normal.    Right arm light touch: normal  Left arm light touch: normal  Right leg light touch: normal  Left leg light touch: normal  Vibration normal.   Right arm vibration: normal  Left arm vibration: normal  Right leg vibration: normal  Left leg vibration: normal  Proprioception normal.   Right arm proprioception: normal  Left arm proprioception: normal  Right leg proprioception: normal  Left leg proprioception: normal  Pinprick normal.   Right arm pinprick: normal  Left arm pinprick: normal  Right leg pinprick: normal  Left leg pinprick: normal  Graphesthesia: normal  Romberg: negative  Stereognosis: normal    GAIT AND COORDINATION     Gait  Gait: shuffling     Coordination   Finger to nose coordination: normal  Heel to shin coordination: normal  Tandem walking coordination: abnormal    Tremor   Resting tremor: present  Intention tremor: absent  Action tremor: absent       Physical Exam  Vitals and nursing note reviewed.   Constitutional:       Appearance: Normal appearance.   HENT:      Head: Normocephalic.   Eyes:      Extraocular Movements: EOM normal.      Pupils: Pupils are equal, round, and reactive to light.   Cardiovascular:      Rate and Rhythm: Normal rate and regular rhythm.      Pulses: Normal pulses.      Heart sounds: Normal heart sounds.   Pulmonary:      Effort: Pulmonary effort is normal.      Breath sounds: Normal breath sounds.   Musculoskeletal:         General: Normal range of motion.      Cervical back: Normal range of motion and neck supple.   Skin:     General: Skin is warm and dry.   Neurological:      General: No focal deficit present.      Mental Status: He is alert and oriented to person, place, and time.      Cranial Nerves: No cranial nerve deficit.      Sensory: No sensory deficit.      Motor: No weakness.      Coordination: Coordination normal. Finger-Nose-Finger Test, Heel to Shin Test and Romberg Test normal.      Gait: Gait abnormal and tandem walk abnormal.      Deep Tendon Reflexes:  Reflexes normal.      Reflex Scores:       Tricep reflexes are 2+ on the right side and 2+ on the left side.       Bicep reflexes are 2+ on the right side and 2+ on the left side.       Brachioradialis reflexes are 2+ on the right side and 2+ on the left side.       Patellar reflexes are 2+ on the right side and 2+ on the left side.       Achilles reflexes are 2+ on the right side and 2+ on the left side.  Psychiatric:         Mood and Affect: Mood normal.         Speech: Speech normal.         Behavior: Behavior normal.          Assessment:     Problem List Items Addressed This Visit          Neuro    Parkinsonian features    Relevant Medications    carbidopa-levodopa  mg (SINEMET)  mg per tablet     Other Visit Diagnoses       Parkinson's disease with dyskinesia without fluctuating manifestations    -  Primary    Relevant Orders    Ambulatory referral/consult to Physical/Occupational Therapy    Gait difficulty        Relevant Orders    Ambulatory referral/consult to Physical/Occupational Therapy                 Primary Diagnosis and ICD10  Parkinson's disease with dyskinesia without fluctuating manifestations [G20.B1]    Plan:     Patient Instructions   Increase sinemet to 1.5 tablets three times daily  Physical therapy at [facility]  Follow-up 3 months  Avoid taking the sinemet with food    Medications Discontinued During This Encounter   Medication Reason    carbidopa-levodopa  mg (SINEMET)  mg per tablet          Requested Prescriptions     Signed Prescriptions Disp Refills    carbidopa-levodopa  mg (SINEMET)  mg per tablet 150 tablet 11     Sig: Take 1.5 tablets three times daily       Orders Placed This Encounter   Procedures    Ambulatory referral/consult to Physical/Occupational Therapy

## 2024-11-14 NOTE — PATIENT INSTRUCTIONS
Increase sinemet to 1.5 tablets three times daily  Physical therapy at [facility]  Follow-up 3 months  Avoid taking the sinemet with food

## 2024-11-16 DIAGNOSIS — R42 VERTIGO: ICD-10-CM

## 2024-11-18 RX ORDER — MECLIZINE HYDROCHLORIDE 25 MG/1
TABLET ORAL
Qty: 90 TABLET | Refills: 0 | Status: SHIPPED | OUTPATIENT
Start: 2024-11-18

## 2025-01-26 DIAGNOSIS — R42 VERTIGO: ICD-10-CM

## 2025-01-27 RX ORDER — MECLIZINE HYDROCHLORIDE 25 MG/1
TABLET ORAL
Qty: 90 TABLET | Refills: 0 | Status: SHIPPED | OUTPATIENT
Start: 2025-01-27

## 2025-02-12 DIAGNOSIS — R42 VERTIGO: ICD-10-CM

## 2025-02-12 RX ORDER — MECLIZINE HYDROCHLORIDE 25 MG/1
TABLET ORAL
Qty: 90 TABLET | Refills: 0 | Status: SHIPPED | OUTPATIENT
Start: 2025-02-12

## 2025-02-18 ENCOUNTER — OFFICE VISIT (OUTPATIENT)
Dept: NEUROLOGY | Facility: CLINIC | Age: 75
End: 2025-02-18
Payer: COMMERCIAL

## 2025-02-18 VITALS
DIASTOLIC BLOOD PRESSURE: 115 MMHG | OXYGEN SATURATION: 98 % | HEIGHT: 75 IN | SYSTOLIC BLOOD PRESSURE: 180 MMHG | RESPIRATION RATE: 18 BRPM | BODY MASS INDEX: 28.77 KG/M2 | HEART RATE: 78 BPM | WEIGHT: 231.38 LBS

## 2025-02-18 DIAGNOSIS — R42 DIZZINESS: ICD-10-CM

## 2025-02-18 DIAGNOSIS — G20.B1 PARKINSON'S DISEASE WITH DYSKINESIA WITHOUT FLUCTUATING MANIFESTATIONS: Primary | ICD-10-CM

## 2025-02-18 DIAGNOSIS — R26.9 GAIT DIFFICULTY: ICD-10-CM

## 2025-02-18 PROCEDURE — 99212 OFFICE O/P EST SF 10 MIN: CPT | Mod: S$PBB,,, | Performed by: NURSE PRACTITIONER

## 2025-02-18 PROCEDURE — 1159F MED LIST DOCD IN RCRD: CPT | Mod: CPTII,,, | Performed by: NURSE PRACTITIONER

## 2025-02-18 PROCEDURE — 3288F FALL RISK ASSESSMENT DOCD: CPT | Mod: CPTII,,, | Performed by: NURSE PRACTITIONER

## 2025-02-18 PROCEDURE — 3077F SYST BP >= 140 MM HG: CPT | Mod: CPTII,,, | Performed by: NURSE PRACTITIONER

## 2025-02-18 PROCEDURE — 1101F PT FALLS ASSESS-DOCD LE1/YR: CPT | Mod: CPTII,,, | Performed by: NURSE PRACTITIONER

## 2025-02-18 PROCEDURE — 1126F AMNT PAIN NOTED NONE PRSNT: CPT | Mod: CPTII,,, | Performed by: NURSE PRACTITIONER

## 2025-02-18 PROCEDURE — 1160F RVW MEDS BY RX/DR IN RCRD: CPT | Mod: CPTII,,, | Performed by: NURSE PRACTITIONER

## 2025-02-18 PROCEDURE — 3008F BODY MASS INDEX DOCD: CPT | Mod: CPTII,,, | Performed by: NURSE PRACTITIONER

## 2025-02-18 PROCEDURE — 99214 OFFICE O/P EST MOD 30 MIN: CPT | Mod: PBBFAC | Performed by: NURSE PRACTITIONER

## 2025-02-18 PROCEDURE — 3080F DIAST BP >= 90 MM HG: CPT | Mod: CPTII,,, | Performed by: NURSE PRACTITIONER

## 2025-02-18 NOTE — PROGRESS NOTES
Subjective:       Patient ID: James Martinez is a 74 y.o. male     Chief Complaint:    No chief complaint on file.       Allergies:  Pcn [penicillins], Penicillamine, and Weed pollen-giant (tall) ragweed    Current Medications:    Outpatient Encounter Medications as of 2/18/2025   Medication Sig Dispense Refill    amLODIPine (NORVASC) 5 MG tablet Take 5 mg by mouth once daily.      aspirin 81 MG Chew Take 81 mg by mouth once daily.      carbidopa-levodopa  mg (SINEMET)  mg per tablet Take 1.5 tablets three times daily 150 tablet 11    cetirizine (ZYRTEC) 10 MG tablet       DULoxetine (CYMBALTA) 20 MG capsule Take 20 mg by mouth.      fluticasone propionate (FLONASE) 50 mcg/actuation nasal spray INHALE 1 TO 2 PUFFS IN EACH NOSTRIL AT BEDTIME      losartan (COZAAR) 50 MG tablet Take 50 mg by mouth once daily.      meclizine (ANTIVERT) 25 mg tablet TAKE 1 TABLET BY MOUTH 3 TIMES DAILY AS NEEDED FOR DIZZINESS. 90 tablet 0    montelukast (SINGULAIR) 10 mg tablet       [DISCONTINUED] diazePAM (VALIUM) 5 MG tablet Take 1 tablet (5 mg total) by mouth every 6 (six) hours as needed for Anxiety (1 tab 30 minutes prior to MRI and 1 tab at time of MRI). 2 tablet 0    [DISCONTINUED] ergocalciferol (ERGOCALCIFEROL) 50,000 unit Cap Take one capsule monthly (Patient not taking: Reported on 9/9/2024) 12 capsule 1    [DISCONTINUED] meclizine (ANTIVERT) 25 mg tablet TAKE 1 TABLET BY MOUTH THREE TIMES A DAY AS NEEDED FOR DIZZINESS 90 tablet 1    [DISCONTINUED] meclizine (ANTIVERT) 25 mg tablet TAKE 1 TABLET BY MOUTH 3 TIMES DAILY AS NEEDED FOR DIZZINESS. 90 tablet 0    [DISCONTINUED] meclizine (ANTIVERT) 25 mg tablet TAKE 1 TABLET BY MOUTH 3 TIMES DAILY AS NEEDED FOR DIZZINESS. 90 tablet 0     No facility-administered encounter medications on file as of 2/18/2025.       History of Present Illness  75 y/o male following in neurology for reported chronic dizziness initially, but more recently diagnosis of parkinson's  disease.    Apparently onset of dizziness was 2018.  He has had extensive workup by both neurology and cardiology.  Actually seen by Dr. Molina at [facility] previously for 2nd opinion and reported they ruled out vertigo.      Prior also reported weakness in BLE with burning and stinging.  And was referred prior for EMG which per the EMR was done at [facility] in June of 2022, indicated NO large fiber neuropathy, but did question a lumbar radiculopathy at L5/S1, but was limited exam due to swelling.  There was no reported lower back pain at that time.  Prior treated with neurontin but was tapered off, unclear why but possibly swelling.  Prior cymbalta was not effective.    Prior MRI brain 6/4/21 no acute findings.  MRI cervical spine 10/8/21 at [facility] no reported significant findings.  MRI T spine 10/8/21 at [facility] reported fracture at T4 with noted hemangiomas.  MRI lumbar spine 6/23/22 multilevel degenerative changes.    At his prior visits we had noted changes in his presentation.  He was developing a mask like appearance, bradykinesia, mild but seemed worsening over several visits.  Initially denied tremor, but this was noted in mid 2024 and was referred for TIGIST scan which was found positive and confirmed the diagnosis of parkinson's disease.  We had discussed this at his follow-up in November of 2024 and started him with sinemet with current dosing 1.5 tablets tid.  I also strongly recommended PT, he desired to have this done at [facility].  He does feel this helped him greatly, he has completed it.  He does feel the increased dosing helped as well, no indication to increase dosing today.           Review of Systems  Review of Systems   Constitutional:  Negative for diaphoresis and fever.   HENT:  Negative for congestion, hearing loss and tinnitus.    Eyes:  Negative for blurred vision, double vision, photophobia, discharge and redness.   Respiratory:  Negative for cough and shortness of breath.    Cardiovascular:  Negative for  chest pain.   Gastrointestinal:  Negative for abdominal pain, nausea and vomiting.   Musculoskeletal:  Positive for falls. Negative for back pain, joint pain, myalgias and neck pain.   Skin:  Negative for itching and rash.   Neurological:  Positive for tremors. Negative for dizziness, sensory change, speech change, focal weakness, seizures, loss of consciousness, weakness and headaches.   Psychiatric/Behavioral:  Negative for depression, hallucinations and memory loss. The patient does not have insomnia.    All other systems reviewed and are negative.     Objective:     NEUROLOGICAL EXAMINATION:     MENTAL STATUS   Oriented to person, place, and time.   Registration: recalls 3 of 3 objects. Recall at 5 minutes: recalls 3 of 3 objects.   Attention: normal. Concentration: normal.   Speech: speech is normal   Level of consciousness: alert  Knowledge: good and consistent with education.   Normal comprehension.     CRANIAL NERVES     CN II   Visual fields full to confrontation.   Visual acuity: normal  Right visual field deficit: none  Left visual field deficit: none     CN III, IV, VI   Pupils are equal, round, and reactive to light.  Extraocular motions are normal.   Right pupil: Size: 3 mm. Shape: regular. Reactivity: brisk. Consensual response: intact. Accommodation: intact.   Left pupil: Size: 3 mm. Shape: regular. Reactivity: brisk. Consensual response: intact. Accommodation: intact.   CN III: no CN III palsy  CN VI: no CN VI palsy  Nystagmus: none   Diplopia: none  Upgaze: normal  Downgaze: normal  Conjugate gaze: present  Vestibulo-ocular reflex: present    CN V   Facial sensation intact.   Right facial sensation deficit: none  Left facial sensation deficit: none  Right corneal reflex: normal  Left corneal reflex: normal    CN VII   Facial expression full, symmetric.   Right facial weakness: none  Left facial weakness: none  Right taste: normal  Left taste: normal    CN VIII   CN VIII normal.   Hearing:  intact    CN IX, X   CN IX normal.   CN X normal.   Palate: symmetric    CN XI   CN XI normal.   Right sternocleidomastoid strength: normal  Left sternocleidomastoid strength: normal  Right trapezius strength: normal  Left trapezius strength: normal    CN XII   CN XII normal.   Tongue: not atrophic  Fasciculations: absent  Tongue deviation: none    MOTOR EXAM   Muscle bulk: normal  Overall muscle tone: normal  Right arm tone: normal  Left arm tone: normal  Right arm pronator drift: absent  Left arm pronator drift: absent  Right leg tone: normal  Left leg tone: normal    Strength   Right neck flexion: 5/5  Left neck flexion: 5/5  Right neck extension: 5/5  Left neck extension: 5/5  Right deltoid: 5/5  Left deltoid: 5/5  Right biceps: 5/5  Left biceps: 5/5  Right triceps: 5/5  Left triceps: 5/5  Right wrist flexion: 5/5  Left wrist flexion: 5/5  Right wrist extension: 5/5  Left wrist extension: 5/5  Right interossei: 5/5  Left interossei: 5/5  Right iliopsoas: 5/5  Left iliopsoas: 5/5  Right quadriceps: 5/5  Left quadriceps: 5/5  Right hamstrin/5  Left hamstrin/5  Right anterior tibial: 5/5  Left anterior tibial: 5/5  Right posterior tibial: 5/5  Left posterior tibial: 5/5  Right gastroc: 5/5  Left gastroc: 5/5    REFLEXES     Reflexes   Right brachioradialis: 2+  Left brachioradialis: 2+  Right biceps: 2+  Left biceps: 2+  Right triceps: 2+  Left triceps: 2+  Right patellar: 2+  Left patellar: 2+  Right achilles: 2+  Left achilles: 2+  Right plantar: normal  Left plantar: normal  Right Jefferson: absent  Left Jefferson: absent  Right ankle clonus: absent  Left ankle clonus: absent  Right pendular knee jerk: absent  Left pendular knee jerk: absent    SENSORY EXAM   Light touch normal.   Right arm light touch: normal  Left arm light touch: normal  Right leg light touch: normal  Left leg light touch: normal  Vibration normal.   Right arm vibration: normal  Left arm vibration: normal  Right leg vibration:  normal  Left leg vibration: normal  Proprioception normal.   Right arm proprioception: normal  Left arm proprioception: normal  Right leg proprioception: normal  Left leg proprioception: normal  Pinprick normal.   Right arm pinprick: normal  Left arm pinprick: normal  Right leg pinprick: normal  Left leg pinprick: normal  Graphesthesia: normal  Romberg: negative  Stereognosis: normal    GAIT AND COORDINATION     Gait  Gait: shuffling     Coordination   Finger to nose coordination: normal  Heel to shin coordination: normal  Tandem walking coordination: abnormal    Tremor   Resting tremor: present  Intention tremor: absent  Action tremor: absent       Physical Exam  Vitals and nursing note reviewed.   Constitutional:       Appearance: Normal appearance.   HENT:      Head: Normocephalic.   Eyes:      Extraocular Movements: EOM normal.      Pupils: Pupils are equal, round, and reactive to light.   Cardiovascular:      Rate and Rhythm: Normal rate and regular rhythm.      Pulses: Normal pulses.      Heart sounds: Normal heart sounds.   Pulmonary:      Effort: Pulmonary effort is normal.      Breath sounds: Normal breath sounds.   Musculoskeletal:         General: Normal range of motion.      Cervical back: Normal range of motion and neck supple.   Skin:     General: Skin is warm and dry.   Neurological:      General: No focal deficit present.      Mental Status: He is alert and oriented to person, place, and time.      Cranial Nerves: No cranial nerve deficit.      Sensory: No sensory deficit.      Motor: No weakness.      Coordination: Coordination normal. Finger-Nose-Finger Test, Heel to Shin Test and Romberg Test normal.      Gait: Gait abnormal and tandem walk abnormal.      Deep Tendon Reflexes: Reflexes normal.      Reflex Scores:       Tricep reflexes are 2+ on the right side and 2+ on the left side.       Bicep reflexes are 2+ on the right side and 2+ on the left side.       Brachioradialis reflexes are 2+ on the  right side and 2+ on the left side.       Patellar reflexes are 2+ on the right side and 2+ on the left side.       Achilles reflexes are 2+ on the right side and 2+ on the left side.  Psychiatric:         Mood and Affect: Mood normal.         Speech: Speech normal.         Behavior: Behavior normal.          Assessment:     Problem List Items Addressed This Visit          Neuro    Parkinson's disease with dyskinesia without fluctuating manifestations - Primary       Other    Dizziness (Chronic)    Gait difficulty              Primary Diagnosis and ICD10  Parkinson's disease with dyskinesia without fluctuating manifestations [G20.B1]    Plan:     Patient Instructions   Sinemet 1.5 tablets three times daily  Physical therapy at [facility] if worsening in ambulation  Follow-up 3 months  Avoid taking the sinemet with food    Medications Discontinued During This Encounter   Medication Reason    diazePAM (VALIUM) 5 MG tablet     ergocalciferol (ERGOCALCIFEROL) 50,000 unit Cap     meclizine (ANTIVERT) 25 mg tablet            Requested Prescriptions      No prescriptions requested or ordered in this encounter       No orders of the defined types were placed in this encounter.

## 2025-02-19 ENCOUNTER — TELEPHONE (OUTPATIENT)
Dept: NEUROLOGY | Facility: CLINIC | Age: 75
End: 2025-02-19
Payer: COMMERCIAL

## 2025-02-19 NOTE — TELEPHONE ENCOUNTER
Returned call to pt got v/m left message I called the pharmacy and they said it was being filled.          ----- Message from Alva sent at 2/19/2025 12:53 PM CST -----  Regarding: Rx refill  Who Called: James MartinezRefill or New Rx:RefillRX Name and Strength:carbidopa-levodopa  mg (SINEMET)  mg per tabletHow is the patient currently taking it? Sig: Take 1.5 tablets three times dailyLocal or Mail Order: LocalList of preferred pharmacies on file: [facility]/pharm[facility][id number] - [place], [place] - [address] [address]Gsldy: [phone] Fax: [phone]: Not open 24 hours Preferred Method of Contact: Phone CallPatient's Preferred Phone Number on File: [phone] Additional Information: Pt. Stated his refill is not till 02/24/2025. Pt. Stated FNP Kishore Eugene told pt. He could double up on Rx and pt. Stated that's why he is out before the refill date. Pt. Would like to speak to the nurse.

## 2025-04-09 DIAGNOSIS — R42 VERTIGO: ICD-10-CM

## 2025-04-09 RX ORDER — MECLIZINE HYDROCHLORIDE 25 MG/1
TABLET ORAL
Qty: 90 TABLET | Refills: 0 | Status: SHIPPED | OUTPATIENT
Start: 2025-04-09

## 2025-05-15 DIAGNOSIS — R42 VERTIGO: ICD-10-CM

## 2025-05-15 RX ORDER — MECLIZINE HYDROCHLORIDE 25 MG/1
TABLET ORAL
Qty: 90 TABLET | Refills: 0 | Status: SHIPPED | OUTPATIENT
Start: 2025-05-15

## 2025-05-19 ENCOUNTER — OFFICE VISIT (OUTPATIENT)
Dept: NEUROLOGY | Facility: CLINIC | Age: 75
End: 2025-05-19
Payer: COMMERCIAL

## 2025-05-19 VITALS
HEART RATE: 73 BPM | DIASTOLIC BLOOD PRESSURE: 92 MMHG | BODY MASS INDEX: 28.65 KG/M2 | OXYGEN SATURATION: 96 % | WEIGHT: 230.38 LBS | HEIGHT: 75 IN | SYSTOLIC BLOOD PRESSURE: 180 MMHG

## 2025-05-19 DIAGNOSIS — R26.9 GAIT DIFFICULTY: ICD-10-CM

## 2025-05-19 DIAGNOSIS — G20.B1 PARKINSON'S DISEASE WITH DYSKINESIA WITHOUT FLUCTUATING MANIFESTATIONS: Primary | ICD-10-CM

## 2025-05-19 PROCEDURE — 1160F RVW MEDS BY RX/DR IN RCRD: CPT | Mod: CPTII,,, | Performed by: NURSE PRACTITIONER

## 2025-05-19 PROCEDURE — 1159F MED LIST DOCD IN RCRD: CPT | Mod: CPTII,,, | Performed by: NURSE PRACTITIONER

## 2025-05-19 PROCEDURE — 1126F AMNT PAIN NOTED NONE PRSNT: CPT | Mod: CPTII,,, | Performed by: NURSE PRACTITIONER

## 2025-05-19 PROCEDURE — 3077F SYST BP >= 140 MM HG: CPT | Mod: CPTII,,, | Performed by: NURSE PRACTITIONER

## 2025-05-19 PROCEDURE — 1101F PT FALLS ASSESS-DOCD LE1/YR: CPT | Mod: CPTII,,, | Performed by: NURSE PRACTITIONER

## 2025-05-19 PROCEDURE — 3288F FALL RISK ASSESSMENT DOCD: CPT | Mod: CPTII,,, | Performed by: NURSE PRACTITIONER

## 2025-05-19 PROCEDURE — 3080F DIAST BP >= 90 MM HG: CPT | Mod: CPTII,,, | Performed by: NURSE PRACTITIONER

## 2025-05-19 PROCEDURE — 99999 PR PBB SHADOW E&M-EST. PATIENT-LVL IV: CPT | Mod: PBBFAC,,, | Performed by: NURSE PRACTITIONER

## 2025-05-19 PROCEDURE — 4010F ACE/ARB THERAPY RXD/TAKEN: CPT | Mod: CPTII,,, | Performed by: NURSE PRACTITIONER

## 2025-05-19 PROCEDURE — 3008F BODY MASS INDEX DOCD: CPT | Mod: CPTII,,, | Performed by: NURSE PRACTITIONER

## 2025-05-19 PROCEDURE — 99212 OFFICE O/P EST SF 10 MIN: CPT | Mod: S$PBB,,, | Performed by: NURSE PRACTITIONER

## 2025-05-19 PROCEDURE — 99214 OFFICE O/P EST MOD 30 MIN: CPT | Mod: PBBFAC | Performed by: NURSE PRACTITIONER

## 2025-05-19 NOTE — PATIENT INSTRUCTIONS
Sinemet 1.5 tablets three times daily  Physical therapy at [facility] if worsening in ambulation  Follow-up 6 months  Avoid taking the sinemet with food

## 2025-05-19 NOTE — PROGRESS NOTES
Subjective:       Patient ID: James Martinez is a 74 y.o. male     Chief Complaint:    No chief complaint on file.       Allergies:  Pcn [penicillins], Penicillamine, and Weed pollen-giant (tall) ragweed    Current Medications:    Outpatient Encounter Medications as of 5/19/2025   Medication Sig Dispense Refill    amLODIPine (NORVASC) 5 MG tablet Take 5 mg by mouth once daily.      aspirin 81 MG Chew Take 81 mg by mouth once daily.      carbidopa-levodopa  mg (SINEMET)  mg per tablet Take 1.5 tablets three times daily 150 tablet 11    cetirizine (ZYRTEC) 10 MG tablet       DULoxetine (CYMBALTA) 20 MG capsule Take 20 mg by mouth.      fluticasone propionate (FLONASE) 50 mcg/actuation nasal spray INHALE 1 TO 2 PUFFS IN EACH NOSTRIL AT BEDTIME      losartan (COZAAR) 50 MG tablet Take 50 mg by mouth once daily.      meclizine (ANTIVERT) 25 mg tablet TAKE 1 TABLET BY MOUTH 3 TIMES DAILY AS NEEDED FOR DIZZINESS. 90 tablet 0    montelukast (SINGULAIR) 10 mg tablet       [DISCONTINUED] meclizine (ANTIVERT) 25 mg tablet TAKE 1 TABLET BY MOUTH 3 TIMES DAILY AS NEEDED FOR DIZZINESS. 90 tablet 0     No facility-administered encounter medications on file as of 5/19/2025.       History of Present Illness  73 y/o male following in neurology for reported chronic dizziness initially, but more recently diagnosis of parkinson's disease.    Apparently onset of dizziness was 2018.  He has had extensive workup by both neurology and cardiology.  Actually seen by Dr. Molina at [facility] previously for 2nd opinion and reported they ruled out vertigo.      Prior also reported weakness in BLE with burning and stinging.  And was referred prior for EMG which per the EMR was done at [facility] in June of 2022, indicated NO large fiber neuropathy, but did question a lumbar radiculopathy at L5/S1, but was limited exam due to swelling.  There was no reported lower back pain at that time.  Prior treated with neurontin but was tapered  off, unclear why but possibly swelling.  Prior cymbalta was not effective.    Prior MRI brain 6/4/21 no acute findings.  MRI cervical spine 10/8/21 at [facility] no reported significant findings.  MRI T spine 10/8/21 at [facility] reported fracture at T4 with noted hemangiomas.  MRI lumbar spine 6/23/22 multilevel degenerative changes.    At his prior visits we had noted changes in his presentation.  He was developing a mask like appearance, bradykinesia, mild but seemed worsening over several visits.  Initially denied tremor, but this was noted in mid 2024 and was referred for TIGIST scan which was found positive and confirmed the diagnosis of parkinson's disease.  We had discussed this at his follow-up in November of 2024 and started him with sinemet with current dosing 1.5 tablets tid.  I also strongly recommended PT, he desired to have this done at [facility].  He does feel this helped him greatly, he has completed it.  He does feel the increased dosing helped as well, no indication to increase dosing today.         Review of Systems  Review of Systems   Constitutional:  Negative for diaphoresis and fever.   HENT:  Negative for congestion, hearing loss and tinnitus.    Eyes:  Negative for blurred vision, double vision, photophobia, discharge and redness.   Respiratory:  Negative for cough and shortness of breath.    Cardiovascular:  Negative for chest pain.   Gastrointestinal:  Negative for abdominal pain, nausea and vomiting.   Musculoskeletal:  Positive for falls. Negative for back pain, joint pain, myalgias and neck pain.   Skin:  Negative for itching and rash.   Neurological:  Positive for tremors. Negative for dizziness, sensory change, speech change, focal weakness, seizures, loss of consciousness, weakness and headaches.   Psychiatric/Behavioral:  Negative for depression, hallucinations and memory loss. The patient does not have insomnia.    All other systems reviewed and are negative.     Objective:     NEUROLOGICAL  EXAMINATION:     MENTAL STATUS   Oriented to person, place, and time.   Registration: recalls 3 of 3 objects. Recall at 5 minutes: recalls 3 of 3 objects.   Attention: normal. Concentration: normal.   Speech: speech is normal   Level of consciousness: alert  Knowledge: good and consistent with education.   Normal comprehension.     CRANIAL NERVES     CN II   Visual fields full to confrontation.   Visual acuity: normal  Right visual field deficit: none  Left visual field deficit: none     CN III, IV, VI   Pupils are equal, round, and reactive to light.  Extraocular motions are normal.   Right pupil: Size: 3 mm. Shape: regular. Reactivity: brisk. Consensual response: intact. Accommodation: intact.   Left pupil: Size: 3 mm. Shape: regular. Reactivity: brisk. Consensual response: intact. Accommodation: intact.   CN III: no CN III palsy  CN VI: no CN VI palsy  Nystagmus: none   Diplopia: none  Upgaze: normal  Downgaze: normal  Conjugate gaze: present  Vestibulo-ocular reflex: present    CN V   Facial sensation intact.   Right facial sensation deficit: none  Left facial sensation deficit: none  Right corneal reflex: normal  Left corneal reflex: normal    CN VII   Facial expression full, symmetric.   Right facial weakness: none  Left facial weakness: none  Right taste: normal  Left taste: normal    CN VIII   CN VIII normal.   Hearing: intact    CN IX, X   CN IX normal.   CN X normal.   Palate: symmetric    CN XI   CN XI normal.   Right sternocleidomastoid strength: normal  Left sternocleidomastoid strength: normal  Right trapezius strength: normal  Left trapezius strength: normal    CN XII   CN XII normal.   Tongue: not atrophic  Fasciculations: absent  Tongue deviation: none    MOTOR EXAM   Muscle bulk: normal  Overall muscle tone: normal  Right arm tone: normal  Left arm tone: normal  Right arm pronator drift: absent  Left arm pronator drift: absent  Right leg tone: normal  Left leg tone: normal    Strength   Right neck  flexion: 5/5  Left neck flexion: 5  Right neck extension: /  Left neck extension:   Right deltoid:   Left deltoid:   Right biceps:   Left biceps:   Right triceps:   Left triceps:   Right wrist flexion:   Left wrist flexion:   Right wrist extension:   Left wrist extension:   Right interossei:   Left interossei:   Right iliopsoas:   Left iliopsoas:   Right quadriceps:   Left quadriceps:   Right hamstrin/5  Left hamstrin/5  Right anterior tibial:   Left anterior tibial:   Right posterior tibial:   Left posterior tibial:   Right gastroc:   Left gastroc:     REFLEXES     Reflexes   Right brachioradialis: 2+  Left brachioradialis: 2+  Right biceps: 2+  Left biceps: 2+  Right triceps: 2+  Left triceps: 2+  Right patellar: 2+  Left patellar: 2+  Right achilles: 2+  Left achilles: 2+  Right plantar: normal  Left plantar: normal  Right Jefferson: absent  Left Jefferson: absent  Right ankle clonus: absent  Left ankle clonus: absent  Right pendular knee jerk: absent  Left pendular knee jerk: absent    SENSORY EXAM   Light touch normal.   Right arm light touch: normal  Left arm light touch: normal  Right leg light touch: normal  Left leg light touch: normal  Vibration normal.   Right arm vibration: normal  Left arm vibration: normal  Right leg vibration: normal  Left leg vibration: normal  Proprioception normal.   Right arm proprioception: normal  Left arm proprioception: normal  Right leg proprioception: normal  Left leg proprioception: normal  Pinprick normal.   Right arm pinprick: normal  Left arm pinprick: normal  Right leg pinprick: normal  Left leg pinprick: normal  Graphesthesia: normal  Romberg: negative  Stereognosis: normal    GAIT AND COORDINATION     Gait  Gait: shuffling     Coordination   Finger to nose coordination: normal  Heel to shin coordination: normal  Tandem walking coordination: abnormal    Tremor   Resting tremor:  present  Intention tremor: absent  Action tremor: absent       Physical Exam  Vitals and nursing note reviewed.   Constitutional:       Appearance: Normal appearance.   HENT:      Head: Normocephalic.   Eyes:      Extraocular Movements: EOM normal.      Pupils: Pupils are equal, round, and reactive to light.   Cardiovascular:      Rate and Rhythm: Normal rate and regular rhythm.      Pulses: Normal pulses.      Heart sounds: Normal heart sounds.   Pulmonary:      Effort: Pulmonary effort is normal.      Breath sounds: Normal breath sounds.   Musculoskeletal:         General: Normal range of motion.      Cervical back: Normal range of motion and neck supple.   Skin:     General: Skin is warm and dry.   Neurological:      General: No focal deficit present.      Mental Status: He is alert and oriented to person, place, and time.      Cranial Nerves: No cranial nerve deficit.      Sensory: No sensory deficit.      Motor: No weakness.      Coordination: Coordination normal. Finger-Nose-Finger Test, Heel to Shin Test and Romberg Test normal.      Gait: Gait abnormal and tandem walk abnormal.      Deep Tendon Reflexes: Reflexes normal.      Reflex Scores:       Tricep reflexes are 2+ on the right side and 2+ on the left side.       Bicep reflexes are 2+ on the right side and 2+ on the left side.       Brachioradialis reflexes are 2+ on the right side and 2+ on the left side.       Patellar reflexes are 2+ on the right side and 2+ on the left side.       Achilles reflexes are 2+ on the right side and 2+ on the left side.  Psychiatric:         Mood and Affect: Mood normal.         Speech: Speech normal.         Behavior: Behavior normal.        Assessment:     Problem List Items Addressed This Visit    None               Primary Diagnosis and ICD10  No primary diagnosis found.    Plan:     There are no Patient Instructions on file for this visit.    There are no discontinued medications.          Requested Prescriptions       No prescriptions requested or ordered in this encounter       No orders of the defined types were placed in this encounter.

## 2025-06-25 DIAGNOSIS — R42 VERTIGO: ICD-10-CM

## 2025-06-25 RX ORDER — MECLIZINE HYDROCHLORIDE 25 MG/1
TABLET ORAL
Qty: 90 TABLET | Refills: 0 | Status: SHIPPED | OUTPATIENT
Start: 2025-06-25

## 2025-07-27 DIAGNOSIS — R42 VERTIGO: ICD-10-CM

## 2025-07-28 RX ORDER — MECLIZINE HYDROCHLORIDE 25 MG/1
TABLET ORAL
Qty: 90 TABLET | Refills: 0 | Status: SHIPPED | OUTPATIENT
Start: 2025-07-28